# Patient Record
Sex: FEMALE | Race: WHITE | HISPANIC OR LATINO | Employment: UNEMPLOYED | URBAN - METROPOLITAN AREA
[De-identification: names, ages, dates, MRNs, and addresses within clinical notes are randomized per-mention and may not be internally consistent; named-entity substitution may affect disease eponyms.]

---
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
## 2022-06-05 ENCOUNTER — OFFICE VISIT (OUTPATIENT)
Dept: URGENT CARE | Age: 22
End: 2022-06-05
Payer: COMMERCIAL

## 2022-06-05 VITALS — TEMPERATURE: 97 F | SYSTOLIC BLOOD PRESSURE: 135 MMHG | DIASTOLIC BLOOD PRESSURE: 74 MMHG | RESPIRATION RATE: 16 BRPM

## 2022-06-05 DIAGNOSIS — J06.9 VIRAL URI WITH COUGH: Primary | ICD-10-CM

## 2022-06-05 DIAGNOSIS — R06.02 SHORTNESS OF BREATH: ICD-10-CM

## 2022-06-05 DIAGNOSIS — R50.9 FEVER, UNSPECIFIED FEVER CAUSE: ICD-10-CM

## 2022-06-05 LAB
FLUAV RNA RESP QL NAA+PROBE: NEGATIVE
FLUBV RNA RESP QL NAA+PROBE: NEGATIVE
SARS-COV-2 AG UPPER RESP QL IA: NEGATIVE
SARS-COV-2 RNA RESP QL NAA+PROBE: NEGATIVE
VALID CONTROL: NORMAL

## 2022-06-05 PROCEDURE — G0382 LEV 3 HOSP TYPE B ED VISIT: HCPCS

## 2022-06-05 PROCEDURE — 87636 SARSCOV2 & INF A&B AMP PRB: CPT

## 2022-06-05 PROCEDURE — S9083 URGENT CARE CENTER GLOBAL: HCPCS

## 2022-06-05 RX ORDER — PREDNISONE 20 MG/1
20 TABLET ORAL 2 TIMES DAILY WITH MEALS
Qty: 6 TABLET | Refills: 0 | Status: SHIPPED | OUTPATIENT
Start: 2022-06-05 | End: 2022-06-08

## 2022-06-05 RX ORDER — EPINEPHRINE 0.15 MG/.3ML
0.15 INJECTION INTRAMUSCULAR ONCE
COMMUNITY

## 2022-06-05 NOTE — PROGRESS NOTES
[address] [facility]        NAME: Bernadine Gonzalez is a 25 y o  female  : 2000    MRN: [id number]  DATE: 2022  TIME: 11:05 AM    Assessment and Plan   Viral URI with cough [J06 9]  1  Viral URI with cough  predniSONE 20 mg tablet    Poct Covid 19 Rapid Antigen Test   2  Fever, unspecified fever cause  Poct Covid 19 Rapid Antigen Test   3  Shortness of breath  predniSONE 20 mg tablet    Poct Covid 19 Rapid Antigen Test     Rapid COVID test negative  Awaiting results of COVID and flu test     Patient Instructions     Prednisone for shortness of breath  Ibuprofen or Tylenol as needed for fever  Quarantine as discussed  Follow up with PCP in 3-5 days  Proceed to  ER if symptoms worsen  Chief Complaint     Chief Complaint   Patient presents with    Cough     Cough, shortness of breath, for 3 days, headache and fever started last night  Has not taken a COVID test           History of Present Illness       Patient presenting for evaluation of worsening cough, shortness of breath, fever and headaches over the past 3 days  Patient states that she has been taking Mucinex and albuterol for her symptoms, is providing minimal relief  She states that her cough is nonproductive  Patient states that her T-max was 101 0°  Patient states that she has 1 COVID vaccine, but had a reaction and is unable to have any more  Patient denies any recent sick contacts  Cough  Associated symptoms include chest pain, a fever, headaches, rhinorrhea, a sore throat, shortness of breath and wheezing  Pertinent negatives include no chills, ear pain or rash  Shortness of Breath  The current episode started in the past 7 days  The problem occurs intermittently  The problem has been rapidly worsening since onset  Associated symptoms include chest pain, coughing, orthopnea, rhinorrhea, a sore throat and wheezing  Pertinent negatives include no leg swelling or palpitations   The symptoms are aggravated by smoke, odors, URIs, any activity, fumes, pollens and weather changes  Review of Systems   Review of Systems   Constitutional: Positive for fever  Negative for chills  HENT: Positive for rhinorrhea and sore throat  Negative for ear pain  Eyes: Negative for pain and visual disturbance  Respiratory: Positive for cough, shortness of breath and wheezing  Cardiovascular: Positive for chest pain and orthopnea  Negative for palpitations and leg swelling  Gastrointestinal: Negative for abdominal pain and vomiting  Genitourinary: Negative for dysuria and hematuria  Musculoskeletal: Negative for arthralgias, back pain and neck pain  Skin: Negative for color change and rash  Neurological: Positive for headaches  Negative for seizures and syncope  All other systems reviewed and are negative          Current Medications       Current Outpatient Medications:     albuterol (2 5 mg/3 mL) 0 083 % nebulizer solution, Take 1 vial (2 5 mg total) by nebulization every 6 (six) hours as needed for wheezing or shortness of breath, Disp: 75 mL, Rfl: 0    albuterol (ACCUNEB) 0 63 MG/3ML nebulizer solution, Take 1 ampule by nebulization every 4 (four) hours as needed for wheezing, Disp: , Rfl:     albuterol (PROVENTIL HFA,VENTOLIN HFA) 90 mcg/act inhaler, Inhale 2 puffs every 4 (four) hours as needed for wheezing, Disp: , Rfl:     albuterol (Ventolin HFA) 90 mcg/act inhaler, Inhale 2 puffs every 6 (six) hours as needed for wheezing, Disp: 18 g, Rfl: 3    EPINEPHrine (EPIPEN JR) 0 15 mg/0 3 mL SOAJ, Inject 0 15 mg into a muscle once, Disp: , Rfl:     predniSONE 20 mg tablet, Take 1 tablet (20 mg total) by mouth 2 (two) times a day with meals for 3 days, Disp: 6 tablet, Rfl: 0    budesonide-formoterol (SYMBICORT) 80-4 5 MCG/ACT inhaler, Inhale 2 puffs 2 (two) times a day Rinse mouth after use , Disp: 1 Inhaler, Rfl: 12    Current Allergies     Allergies as of 06/05/2022 - Reviewed 06/05/2022   Allergen Reaction Noted    Carboxymethylcellulose Anaphylaxis 04/23/2021    Covid-19 (adenovirus) vaccine Anaphylaxis 04/23/2021    Polysorbate 80 [sorbitan] Anaphylaxis 04/23/2021    Dust mite extract  07/30/2013    Pollen extract  07/30/2013    Tree extract  07/30/2013    Cat hair extract Hives 07/30/2013    Nuts - food allergy Rash and Throat Swelling 07/30/2013            The following portions of the patient's history were reviewed and updated as appropriate: allergies, current medications, past family history, past medical history, past social history, past surgical history and problem list      Past Medical History:   Diagnosis Date    Allergic     Allergic rhinitis     Anemia     Asthma     Eczema     Headache        No past surgical history on file  Family History   Problem Relation Age of Onset    Sjogren's syndrome Mother     Asthma Mother     Allergies Mother     No Known Problems Father     No Known Problems Brother     No Known Problems Brother     No Known Problems Brother          Medications have been verified  Objective   /74   Temp (!) 97 °F (36 1 °C)   Resp 16        Physical Exam     Physical Exam  Vitals and nursing note reviewed  Constitutional:       General: She is not in acute distress  Appearance: Normal appearance  She is normal weight  She is not ill-appearing, toxic-appearing or diaphoretic  HENT:      Head: Normocephalic and atraumatic  Right Ear: Tympanic membrane normal       Left Ear: Tympanic membrane normal       Nose: Congestion and rhinorrhea present  Rhinorrhea is clear  Mouth/Throat:      Mouth: Mucous membranes are moist       Pharynx: Oropharynx is clear  No oropharyngeal exudate or posterior oropharyngeal erythema  Eyes:      Extraocular Movements: Extraocular movements intact  Conjunctiva/sclera: Conjunctivae normal       Pupils: Pupils are equal, round, and reactive to light     Cardiovascular:      Rate and Rhythm: Normal rate and regular rhythm  Pulses: Normal pulses  Heart sounds: Normal heart sounds  No murmur heard  No friction rub  No gallop  Pulmonary:      Effort: Pulmonary effort is normal  No respiratory distress  Breath sounds: Normal breath sounds  No stridor  No wheezing, rhonchi or rales  Chest:      Chest wall: No tenderness  Abdominal:      General: Abdomen is flat  Bowel sounds are normal       Palpations: Abdomen is soft  Tenderness: There is no abdominal tenderness  There is no guarding or rebound  Musculoskeletal:         General: No tenderness  Normal range of motion  Cervical back: Normal range of motion and neck supple  Skin:     General: Skin is warm and dry  Capillary Refill: Capillary refill takes less than 2 seconds  Neurological:      General: No focal deficit present  Mental Status: She is alert and oriented to person, place, and time     Psychiatric:         Mood and Affect: Mood normal          Behavior: Behavior normal

## 2022-06-05 NOTE — PATIENT INSTRUCTIONS
Please take steroid with breakfast and dinner for the next 3 days  You may continue to use Mucinex and albuterol as needed  Please take ibuprofen or Tylenol as needed for headache and fever  If symptoms persist, please follow-up with your primary care provider  Please allow 24-48 hours for COVID and flu test to results  If you are positive for COVID, we recommend a 5 day quarantine from symptom onset  If you are positive for flu, we recommend quarantine into your fever free for 24 hours without fever reducing medications

## 2023-07-14 ENCOUNTER — APPOINTMENT (OUTPATIENT)
Dept: LAB | Facility: CLINIC | Age: 23
End: 2023-07-14
Payer: COMMERCIAL

## 2023-07-14 DIAGNOSIS — D64.9 ANEMIA, UNSPECIFIED TYPE: ICD-10-CM

## 2023-07-14 DIAGNOSIS — Z00.01 ENCOUNTER FOR GENERAL ADULT MEDICAL EXAMINATION WITH ABNORMAL FINDINGS: ICD-10-CM

## 2023-07-14 LAB
25(OH)D3 SERPL-MCNC: 22 NG/ML (ref 30–100)
ALBUMIN SERPL BCP-MCNC: 3.7 G/DL (ref 3.5–5)
ALP SERPL-CCNC: 48 U/L (ref 46–116)
ALT SERPL W P-5'-P-CCNC: 14 U/L (ref 12–78)
ANION GAP SERPL CALCULATED.3IONS-SCNC: 5 MMOL/L
AST SERPL W P-5'-P-CCNC: 10 U/L (ref 5–45)
BASOPHILS # BLD AUTO: 0.04 THOUSANDS/ÂΜL (ref 0–0.1)
BASOPHILS NFR BLD AUTO: 1 % (ref 0–1)
BILIRUB SERPL-MCNC: 0.64 MG/DL (ref 0.2–1)
BUN SERPL-MCNC: 13 MG/DL (ref 5–25)
CALCIUM SERPL-MCNC: 9 MG/DL (ref 8.3–10.1)
CHLORIDE SERPL-SCNC: 110 MMOL/L (ref 96–108)
CO2 SERPL-SCNC: 24 MMOL/L (ref 21–32)
CREAT SERPL-MCNC: 0.89 MG/DL (ref 0.6–1.3)
EOSINOPHIL # BLD AUTO: 0.16 THOUSAND/ÂΜL (ref 0–0.61)
EOSINOPHIL NFR BLD AUTO: 3 % (ref 0–6)
ERYTHROCYTE [DISTWIDTH] IN BLOOD BY AUTOMATED COUNT: 17.7 % (ref 11.6–15.1)
FERRITIN SERPL-MCNC: 5 NG/ML (ref 11–307)
GFR SERPL CREATININE-BSD FRML MDRD: 93 ML/MIN/1.73SQ M
GLUCOSE P FAST SERPL-MCNC: 82 MG/DL (ref 65–99)
HCT VFR BLD AUTO: 36.5 % (ref 34.8–46.1)
HGB BLD-MCNC: 10.7 G/DL (ref 11.5–15.4)
IMM GRANULOCYTES # BLD AUTO: 0.01 THOUSAND/UL (ref 0–0.2)
IMM GRANULOCYTES NFR BLD AUTO: 0 % (ref 0–2)
LYMPHOCYTES # BLD AUTO: 2.29 THOUSANDS/ÂΜL (ref 0.6–4.47)
LYMPHOCYTES NFR BLD AUTO: 37 % (ref 14–44)
MCH RBC QN AUTO: 23 PG (ref 26.8–34.3)
MCHC RBC AUTO-ENTMCNC: 29.3 G/DL (ref 31.4–37.4)
MCV RBC AUTO: 78 FL (ref 82–98)
MONOCYTES # BLD AUTO: 0.52 THOUSAND/ÂΜL (ref 0.17–1.22)
MONOCYTES NFR BLD AUTO: 8 % (ref 4–12)
NEUTROPHILS # BLD AUTO: 3.19 THOUSANDS/ÂΜL (ref 1.85–7.62)
NEUTS SEG NFR BLD AUTO: 51 % (ref 43–75)
NRBC BLD AUTO-RTO: 0 /100 WBCS
PLATELET # BLD AUTO: 318 THOUSANDS/UL (ref 149–390)
PMV BLD AUTO: 10.4 FL (ref 8.9–12.7)
POTASSIUM SERPL-SCNC: 4 MMOL/L (ref 3.5–5.3)
PROT SERPL-MCNC: 7.1 G/DL (ref 6.4–8.4)
RBC # BLD AUTO: 4.66 MILLION/UL (ref 3.81–5.12)
SODIUM SERPL-SCNC: 139 MMOL/L (ref 135–147)
TSH SERPL DL<=0.05 MIU/L-ACNC: 1.04 UIU/ML (ref 0.45–4.5)
VIT B12 SERPL-MCNC: 177 PG/ML (ref 180–914)
WBC # BLD AUTO: 6.21 THOUSAND/UL (ref 4.31–10.16)

## 2023-07-14 PROCEDURE — 36415 COLL VENOUS BLD VENIPUNCTURE: CPT

## 2023-07-14 PROCEDURE — 80053 COMPREHEN METABOLIC PANEL: CPT

## 2023-07-14 PROCEDURE — 85025 COMPLETE CBC W/AUTO DIFF WBC: CPT

## 2023-07-14 PROCEDURE — 82306 VITAMIN D 25 HYDROXY: CPT

## 2023-07-14 PROCEDURE — 82728 ASSAY OF FERRITIN: CPT

## 2023-07-14 PROCEDURE — 82607 VITAMIN B-12: CPT

## 2023-07-14 PROCEDURE — 84443 ASSAY THYROID STIM HORMONE: CPT

## 2023-10-07 ENCOUNTER — APPOINTMENT (OUTPATIENT)
Dept: LAB | Facility: CLINIC | Age: 23
End: 2023-10-07
Payer: COMMERCIAL

## 2023-10-07 ENCOUNTER — TRANSCRIBE ORDERS (OUTPATIENT)
Dept: LAB | Facility: CLINIC | Age: 23
End: 2023-10-07

## 2023-10-07 DIAGNOSIS — E55.9 AVITAMINOSIS D: ICD-10-CM

## 2023-10-07 DIAGNOSIS — D64.9 ANEMIA, UNSPECIFIED TYPE: Primary | ICD-10-CM

## 2023-10-07 DIAGNOSIS — E53.9 VITAMIN B-COMPLEX DEFICIENCY: ICD-10-CM

## 2023-10-07 DIAGNOSIS — D64.9 ANEMIA, UNSPECIFIED TYPE: ICD-10-CM

## 2023-10-07 LAB
25(OH)D3 SERPL-MCNC: 47.8 NG/ML (ref 30–100)
ANA SER QL IA: NEGATIVE
BASOPHILS # BLD AUTO: 0.03 THOUSANDS/ÂΜL (ref 0–0.1)
BASOPHILS NFR BLD AUTO: 1 % (ref 0–1)
EOSINOPHIL # BLD AUTO: 0.12 THOUSAND/ÂΜL (ref 0–0.61)
EOSINOPHIL NFR BLD AUTO: 2 % (ref 0–6)
ERYTHROCYTE [DISTWIDTH] IN BLOOD BY AUTOMATED COUNT: 15.7 % (ref 11.6–15.1)
ERYTHROCYTE [SEDIMENTATION RATE] IN BLOOD: 18 MM/HOUR (ref 0–19)
FERRITIN SERPL-MCNC: 11 NG/ML (ref 11–307)
FOLATE SERPL-MCNC: 12.9 NG/ML
HCT VFR BLD AUTO: 42.4 % (ref 34.8–46.1)
HGB BLD-MCNC: 13 G/DL (ref 11.5–15.4)
IMM GRANULOCYTES # BLD AUTO: 0.02 THOUSAND/UL (ref 0–0.2)
IMM GRANULOCYTES NFR BLD AUTO: 0 % (ref 0–2)
IRON SATN MFR SERPL: 20 % (ref 15–50)
IRON SERPL-MCNC: 76 UG/DL (ref 50–212)
LYMPHOCYTES # BLD AUTO: 2.21 THOUSANDS/ÂΜL (ref 0.6–4.47)
LYMPHOCYTES NFR BLD AUTO: 36 % (ref 14–44)
MCH RBC QN AUTO: 25.3 PG (ref 26.8–34.3)
MCHC RBC AUTO-ENTMCNC: 30.7 G/DL (ref 31.4–37.4)
MCV RBC AUTO: 83 FL (ref 82–98)
MONOCYTES # BLD AUTO: 0.46 THOUSAND/ÂΜL (ref 0.17–1.22)
MONOCYTES NFR BLD AUTO: 7 % (ref 4–12)
NEUTROPHILS # BLD AUTO: 3.34 THOUSANDS/ÂΜL (ref 1.85–7.62)
NEUTS SEG NFR BLD AUTO: 54 % (ref 43–75)
NRBC BLD AUTO-RTO: 0 /100 WBCS
PLATELET # BLD AUTO: 300 THOUSANDS/UL (ref 149–390)
PMV BLD AUTO: 10 FL (ref 8.9–12.7)
RBC # BLD AUTO: 5.14 MILLION/UL (ref 3.81–5.12)
RETICS # AUTO: NORMAL 10*3/UL (ref 14097–95744)
RETICS # CALC: 1.69 % (ref 0.37–1.87)
TIBC SERPL-MCNC: 379 UG/DL (ref 250–450)
UIBC SERPL-MCNC: 303 UG/DL (ref 155–355)
VIT B12 SERPL-MCNC: 805 PG/ML (ref 180–914)
WBC # BLD AUTO: 6.18 THOUSAND/UL (ref 4.31–10.16)

## 2023-10-07 PROCEDURE — 86038 ANTINUCLEAR ANTIBODIES: CPT

## 2023-10-07 PROCEDURE — 86430 RHEUMATOID FACTOR TEST QUAL: CPT

## 2023-10-07 PROCEDURE — 85025 COMPLETE CBC W/AUTO DIFF WBC: CPT

## 2023-10-07 PROCEDURE — 36415 COLL VENOUS BLD VENIPUNCTURE: CPT

## 2023-10-07 PROCEDURE — 82607 VITAMIN B-12: CPT

## 2023-10-07 PROCEDURE — 85652 RBC SED RATE AUTOMATED: CPT

## 2023-10-07 PROCEDURE — 83550 IRON BINDING TEST: CPT

## 2023-10-07 PROCEDURE — 83540 ASSAY OF IRON: CPT

## 2023-10-07 PROCEDURE — 82728 ASSAY OF FERRITIN: CPT

## 2023-10-07 PROCEDURE — 82746 ASSAY OF FOLIC ACID SERUM: CPT

## 2023-10-07 PROCEDURE — 85045 AUTOMATED RETICULOCYTE COUNT: CPT

## 2023-10-07 PROCEDURE — 82306 VITAMIN D 25 HYDROXY: CPT

## 2023-10-08 LAB — RHEUMATOID FACT SER QL LA: NEGATIVE

## 2023-12-01 ENCOUNTER — APPOINTMENT (EMERGENCY)
Dept: RADIOLOGY | Facility: HOSPITAL | Age: 23
End: 2023-12-01
Payer: COMMERCIAL

## 2023-12-01 ENCOUNTER — ANESTHESIA (EMERGENCY)
Dept: PERIOP | Facility: HOSPITAL | Age: 23
End: 2023-12-01
Payer: COMMERCIAL

## 2023-12-01 ENCOUNTER — HOSPITAL ENCOUNTER (EMERGENCY)
Facility: HOSPITAL | Age: 23
Discharge: HOME | End: 2023-12-01
Attending: EMERGENCY MEDICINE | Admitting: OBSTETRICS & GYNECOLOGY
Payer: COMMERCIAL

## 2023-12-01 ENCOUNTER — ANESTHESIA EVENT (EMERGENCY)
Dept: PERIOP | Facility: HOSPITAL | Age: 23
End: 2023-12-01
Payer: COMMERCIAL

## 2023-12-01 VITALS
RESPIRATION RATE: 17 BRPM | DIASTOLIC BLOOD PRESSURE: 68 MMHG | SYSTOLIC BLOOD PRESSURE: 117 MMHG | TEMPERATURE: 97.5 F | OXYGEN SATURATION: 100 % | HEART RATE: 88 BPM

## 2023-12-01 DIAGNOSIS — O00.90 ECTOPIC PREGNANCY: Primary | ICD-10-CM

## 2023-12-01 LAB
ABO GROUP BLD: NORMAL
ALBUMIN SERPL BCP-MCNC: 4.1 G/DL (ref 3.5–5)
ALP SERPL-CCNC: 34 U/L (ref 34–104)
ALT SERPL W P-5'-P-CCNC: 12 U/L (ref 7–52)
ANION GAP SERPL CALCULATED.3IONS-SCNC: 9 MMOL/L
AST SERPL W P-5'-P-CCNC: 15 U/L (ref 13–39)
B-HCG SERPL-ACNC: 6295 MIU/ML (ref 0–5)
BACTERIA UR QL AUTO: ABNORMAL /HPF
BILIRUB SERPL-MCNC: 0.71 MG/DL (ref 0.2–1)
BILIRUB UR QL STRIP: NEGATIVE
BUN SERPL-MCNC: 8 MG/DL (ref 5–25)
CALCIUM SERPL-MCNC: 8.4 MG/DL (ref 8.4–10.2)
CHLORIDE SERPL-SCNC: 109 MMOL/L (ref 96–108)
CLARITY UR: CLEAR
CO2 SERPL-SCNC: 20 MMOL/L (ref 21–32)
COLOR UR: ABNORMAL
CREAT SERPL-MCNC: 0.65 MG/DL (ref 0.6–1.3)
ERYTHROCYTE [DISTWIDTH] IN BLOOD BY AUTOMATED COUNT: 13.8 % (ref 11.6–15.1)
EXT PREGNANCY TEST URINE: POSITIVE
EXT. CONTROL: ABNORMAL
GFR SERPL CREATININE-BSD FRML MDRD: 127 ML/MIN/1.73SQ M
GLUCOSE SERPL-MCNC: 77 MG/DL (ref 65–140)
GLUCOSE SERPL-MCNC: 87 MG/DL (ref 65–140)
GLUCOSE UR STRIP-MCNC: NEGATIVE MG/DL
HCT VFR BLD AUTO: 38.1 % (ref 34.8–46.1)
HGB BLD-MCNC: 12.3 G/DL (ref 11.5–15.4)
HGB UR QL STRIP.AUTO: ABNORMAL
KETONES UR STRIP-MCNC: NEGATIVE MG/DL
LEUKOCYTE ESTERASE UR QL STRIP: NEGATIVE
MCH RBC QN AUTO: 26.3 PG (ref 26.8–34.3)
MCHC RBC AUTO-ENTMCNC: 32.3 G/DL (ref 31.4–37.4)
MCV RBC AUTO: 81 FL (ref 82–98)
MUCOUS THREADS UR QL AUTO: ABNORMAL
NITRITE UR QL STRIP: NEGATIVE
NON-SQ EPI CELLS URNS QL MICRO: ABNORMAL /HPF
PH UR STRIP.AUTO: 7.5 [PH]
PLATELET # BLD AUTO: 250 THOUSANDS/UL (ref 149–390)
PMV BLD AUTO: 9.5 FL (ref 8.9–12.7)
POTASSIUM SERPL-SCNC: 3.9 MMOL/L (ref 3.5–5.3)
PROT SERPL-MCNC: 6.2 G/DL (ref 6.4–8.4)
PROT UR STRIP-MCNC: ABNORMAL MG/DL
RBC # BLD AUTO: 4.68 MILLION/UL (ref 3.81–5.12)
RBC #/AREA URNS AUTO: ABNORMAL /HPF
RH BLD: POSITIVE
SODIUM SERPL-SCNC: 138 MMOL/L (ref 135–147)
SP GR UR STRIP.AUTO: 1.02 (ref 1–1.03)
UROBILINOGEN UR STRIP-ACNC: <2 MG/DL
WBC # BLD AUTO: 6.78 THOUSAND/UL (ref 4.31–10.16)
WBC #/AREA URNS AUTO: ABNORMAL /HPF

## 2023-12-01 PROCEDURE — 86900 BLOOD TYPING SEROLOGIC ABO: CPT

## 2023-12-01 PROCEDURE — 85027 COMPLETE CBC AUTOMATED: CPT

## 2023-12-01 PROCEDURE — 36415 COLL VENOUS BLD VENIPUNCTURE: CPT

## 2023-12-01 PROCEDURE — 84702 CHORIONIC GONADOTROPIN TEST: CPT

## 2023-12-01 PROCEDURE — 82948 REAGENT STRIP/BLOOD GLUCOSE: CPT

## 2023-12-01 PROCEDURE — 86850 RBC ANTIBODY SCREEN: CPT

## 2023-12-01 PROCEDURE — 81025 URINE PREGNANCY TEST: CPT

## 2023-12-01 PROCEDURE — 80053 COMPREHEN METABOLIC PANEL: CPT

## 2023-12-01 PROCEDURE — 99284 EMERGENCY DEPT VISIT MOD MDM: CPT

## 2023-12-01 PROCEDURE — 76801 OB US < 14 WKS SINGLE FETUS: CPT

## 2023-12-01 PROCEDURE — 96375 TX/PRO/DX INJ NEW DRUG ADDON: CPT

## 2023-12-01 PROCEDURE — 88305 TISSUE EXAM BY PATHOLOGIST: CPT | Performed by: PATHOLOGY

## 2023-12-01 PROCEDURE — 96366 THER/PROPH/DIAG IV INF ADDON: CPT

## 2023-12-01 PROCEDURE — 96365 THER/PROPH/DIAG IV INF INIT: CPT

## 2023-12-01 PROCEDURE — 81001 URINALYSIS AUTO W/SCOPE: CPT

## 2023-12-01 PROCEDURE — 86901 BLOOD TYPING SEROLOGIC RH(D): CPT

## 2023-12-01 RX ORDER — FENTANYL CITRATE 50 UG/ML
INJECTION, SOLUTION INTRAMUSCULAR; INTRAVENOUS AS NEEDED
Status: DISCONTINUED | OUTPATIENT
Start: 2023-12-01 | End: 2023-12-01

## 2023-12-01 RX ORDER — IBUPROFEN 600 MG/1
600 TABLET ORAL EVERY 6 HOURS PRN
Status: DISCONTINUED | OUTPATIENT
Start: 2023-12-01 | End: 2023-12-01 | Stop reason: HOSPADM

## 2023-12-01 RX ORDER — OXYCODONE HYDROCHLORIDE 5 MG/1
10 TABLET ORAL EVERY 4 HOURS PRN
Status: DISCONTINUED | OUTPATIENT
Start: 2023-12-01 | End: 2023-12-01 | Stop reason: HOSPADM

## 2023-12-01 RX ORDER — ONDANSETRON 2 MG/ML
INJECTION INTRAMUSCULAR; INTRAVENOUS AS NEEDED
Status: DISCONTINUED | OUTPATIENT
Start: 2023-12-01 | End: 2023-12-01

## 2023-12-01 RX ORDER — OXYCODONE HYDROCHLORIDE 5 MG/1
5 TABLET ORAL EVERY 4 HOURS PRN
Status: DISCONTINUED | OUTPATIENT
Start: 2023-12-01 | End: 2023-12-01 | Stop reason: HOSPADM

## 2023-12-01 RX ORDER — SODIUM CHLORIDE, SODIUM LACTATE, POTASSIUM CHLORIDE, CALCIUM CHLORIDE 600; 310; 30; 20 MG/100ML; MG/100ML; MG/100ML; MG/100ML
INJECTION, SOLUTION INTRAVENOUS CONTINUOUS PRN
Status: DISCONTINUED | OUTPATIENT
Start: 2023-12-01 | End: 2023-12-01

## 2023-12-01 RX ORDER — ROCURONIUM BROMIDE 10 MG/ML
INJECTION, SOLUTION INTRAVENOUS AS NEEDED
Status: DISCONTINUED | OUTPATIENT
Start: 2023-12-01 | End: 2023-12-01

## 2023-12-01 RX ORDER — IBUPROFEN 600 MG/1
600 TABLET ORAL EVERY 6 HOURS PRN
Qty: 30 TABLET | Refills: 0 | Status: SHIPPED | OUTPATIENT
Start: 2023-12-01

## 2023-12-01 RX ORDER — MIDAZOLAM HYDROCHLORIDE 2 MG/2ML
INJECTION, SOLUTION INTRAMUSCULAR; INTRAVENOUS AS NEEDED
Status: DISCONTINUED | OUTPATIENT
Start: 2023-12-01 | End: 2023-12-01

## 2023-12-01 RX ORDER — ETOMIDATE 2 MG/ML
INJECTION INTRAVENOUS AS NEEDED
Status: DISCONTINUED | OUTPATIENT
Start: 2023-12-01 | End: 2023-12-01

## 2023-12-01 RX ORDER — ONDANSETRON 2 MG/ML
4 INJECTION INTRAMUSCULAR; INTRAVENOUS EVERY 6 HOURS PRN
Status: DISCONTINUED | OUTPATIENT
Start: 2023-12-01 | End: 2023-12-01 | Stop reason: HOSPADM

## 2023-12-01 RX ORDER — SODIUM CHLORIDE, SODIUM GLUCONATE, SODIUM ACETATE, POTASSIUM CHLORIDE, MAGNESIUM CHLORIDE, SODIUM PHOSPHATE, DIBASIC, AND POTASSIUM PHOSPHATE .53; .5; .37; .037; .03; .012; .00082 G/100ML; G/100ML; G/100ML; G/100ML; G/100ML; G/100ML; G/100ML
1000 INJECTION, SOLUTION INTRAVENOUS ONCE
Status: COMPLETED | OUTPATIENT
Start: 2023-12-01 | End: 2023-12-01

## 2023-12-01 RX ORDER — KETOROLAC TROMETHAMINE 30 MG/ML
INJECTION, SOLUTION INTRAMUSCULAR; INTRAVENOUS AS NEEDED
Status: DISCONTINUED | OUTPATIENT
Start: 2023-12-01 | End: 2023-12-01

## 2023-12-01 RX ORDER — FENTANYL CITRATE/PF 50 MCG/ML
50 SYRINGE (ML) INJECTION
Status: DISCONTINUED | OUTPATIENT
Start: 2023-12-01 | End: 2023-12-01 | Stop reason: HOSPADM

## 2023-12-01 RX ORDER — DEXAMETHASONE SODIUM PHOSPHATE 10 MG/ML
INJECTION, SOLUTION INTRAMUSCULAR; INTRAVENOUS AS NEEDED
Status: DISCONTINUED | OUTPATIENT
Start: 2023-12-01 | End: 2023-12-01

## 2023-12-01 RX ORDER — ACETAMINOPHEN 325 MG/1
975 TABLET ORAL EVERY 6 HOURS PRN
Status: DISCONTINUED | OUTPATIENT
Start: 2023-12-01 | End: 2023-12-01 | Stop reason: HOSPADM

## 2023-12-01 RX ORDER — NEOSTIGMINE METHYLSULFATE 1 MG/ML
INJECTION INTRAVENOUS AS NEEDED
Status: DISCONTINUED | OUTPATIENT
Start: 2023-12-01 | End: 2023-12-01

## 2023-12-01 RX ORDER — GLYCOPYRROLATE 0.2 MG/ML
INJECTION INTRAMUSCULAR; INTRAVENOUS AS NEEDED
Status: DISCONTINUED | OUTPATIENT
Start: 2023-12-01 | End: 2023-12-01

## 2023-12-01 RX ORDER — SUCCINYLCHOLINE/SOD CL,ISO/PF 100 MG/5ML
SYRINGE (ML) INTRAVENOUS AS NEEDED
Status: DISCONTINUED | OUTPATIENT
Start: 2023-12-01 | End: 2023-12-01

## 2023-12-01 RX ADMIN — SODIUM CHLORIDE 12 MCG: 9 INJECTION, SOLUTION INTRAVENOUS at 17:27

## 2023-12-01 RX ADMIN — FENTANYL CITRATE 50 MCG: 50 INJECTION INTRAMUSCULAR; INTRAVENOUS at 17:41

## 2023-12-01 RX ADMIN — SODIUM CHLORIDE, SODIUM LACTATE, POTASSIUM CHLORIDE, AND CALCIUM CHLORIDE: .6; .31; .03; .02 INJECTION, SOLUTION INTRAVENOUS at 16:05

## 2023-12-01 RX ADMIN — ONDANSETRON 4 MG: 2 INJECTION INTRAMUSCULAR; INTRAVENOUS at 17:01

## 2023-12-01 RX ADMIN — SODIUM CHLORIDE, SODIUM LACTATE, POTASSIUM CHLORIDE, AND CALCIUM CHLORIDE: .6; .31; .03; .02 INJECTION, SOLUTION INTRAVENOUS at 17:13

## 2023-12-01 RX ADMIN — SODIUM CHLORIDE, SODIUM GLUCONATE, SODIUM ACETATE, POTASSIUM CHLORIDE, MAGNESIUM CHLORIDE, SODIUM PHOSPHATE, DIBASIC, AND POTASSIUM PHOSPHATE 1000 ML: .53; .5; .37; .037; .03; .012; .00082 INJECTION, SOLUTION INTRAVENOUS at 12:42

## 2023-12-01 RX ADMIN — FENTANYL CITRATE 100 MCG: 50 INJECTION INTRAMUSCULAR; INTRAVENOUS at 16:11

## 2023-12-01 RX ADMIN — DEXAMETHASONE SODIUM PHOSPHATE 10 MG: 10 INJECTION, SOLUTION INTRAMUSCULAR; INTRAVENOUS at 16:34

## 2023-12-01 RX ADMIN — KETOROLAC TROMETHAMINE 30 MG: 30 INJECTION, SOLUTION INTRAMUSCULAR; INTRAVENOUS at 17:06

## 2023-12-01 RX ADMIN — FENTANYL CITRATE 100 MCG: 50 INJECTION INTRAMUSCULAR; INTRAVENOUS at 16:40

## 2023-12-01 RX ADMIN — IBUPROFEN 600 MG: 600 TABLET, FILM COATED ORAL at 19:24

## 2023-12-01 RX ADMIN — Medication 100 MG: at 16:13

## 2023-12-01 RX ADMIN — NEOSTIGMINE 3 MG: 1 INJECTION INTRAVENOUS at 17:19

## 2023-12-01 RX ADMIN — ETOMIDATE 20 MG: 2 INJECTION INTRAVENOUS at 16:12

## 2023-12-01 RX ADMIN — GLYCOPYRROLATE 0.2 MCG: 0.2 INJECTION, SOLUTION INTRAMUSCULAR; INTRAVENOUS at 17:19

## 2023-12-01 RX ADMIN — ROCURONIUM BROMIDE 25 MG: 10 INJECTION, SOLUTION INTRAVENOUS at 16:19

## 2023-12-01 RX ADMIN — MIDAZOLAM 2 MG: 1 INJECTION INTRAMUSCULAR; INTRAVENOUS at 16:03

## 2023-12-01 NOTE — Clinical Note
Morena Jha accompanied Rosalina Morales to the emergency department on 12/1/2023. Return date if applicable: 22/68/3662        If you have any questions or concerns, please don't hesitate to call.       Michelle iBllings RN

## 2023-12-01 NOTE — ANESTHESIA POSTPROCEDURE EVALUATION
Post-Op Assessment Note    CV Status:  Stable  Pain Score: 0    Pain management: adequate       Mental Status:  Sleepy   Hydration Status:  Stable   PONV Controlled:  None   Airway Patency:  Patent     Post Op Vitals Reviewed: Yes    No anethesia notable event occurred.     Staff: CRNA               BP   123/58   Temp   97.4   Pulse 107   Resp   16   SpO2   97

## 2023-12-01 NOTE — H&P
H&P - Gynecology   Елена Mercedes 26 y.o. female MRN: [id number]  Unit/Bed#: ED 08 Encounter: [id number]      Assessment/Plan   Ectopic pregnancy without intrauterine pregnancy  Assessment & Plan  Troy Ortiz is a 22y female LMP 10/8/23 who was sent from [facility] for management of ectopic pregnancy. Patient hemodynamically stable, no evidence of rupture of ectopic pregnancy. Discussed medical management (methotrexate) versus surgical management with laparoscopic salpingectomy. Risks of methotrexate use include possible need for second dose of methotrexate, need for close office and laboratory follow-up, rupture, stomatitis, GI upset, tubal scarring. Risks of laparoscopic salpingectomy include bleeding, infection, damage to surrounding structures, future ectopic pregnancies. Discussed that fertility would not be compromised with one functional tube. Recommended HSG at some point in the future. Patient's mother shared concerns of methotrexate injection, due to anaphylactic reaction to COVID-19 vaccine in the past. We confirmed with pharmacy that the methotrexate injection does not contain the allergen that patient had allergic reaction to. After patient discussed with mother and partner, patient decided to proceed with surgical management. Consent signed for unilateral salpingectomy. Patient opted for hospital disposition for products of conception, and form signed. HCG, Quant   Date Value Ref Range Status   12/01/2023 6,295 (H) 0 - 5 mIU/mL Final        FINDINGS:     UTERUS:  The uterus is anteverted in position, measuring 6.2 x 3.4 x 5.8 cm. Contour and echotexture appear normal.  The cervix is closed and within normal limits. ENDOMETRIUM:  Endometrial stripe measures 5.0 mm. No evidence of intrauterine gestation. OVARIES/ADNEXA:  Within the right adnexa there is a 1.5 x 1.5 x 1.2 cm hyperechoic rounded structure with central anechoic focus and ring of color flow.  This is located superior to the ovary. Trace pelvic free fluid. Right ovary:  2.8 x 2.2 x 1.2 cm. Volume 3.9  Doppler flow present. No suspicious abnormality. Left ovary:  2.3 x 1.7 x 2.4 cm. Volume  4.8  Doppler flow present. No suspicious abnormality. IMPRESSION:  Findings concerning for ectopic pregnancy. There is a hyperechoic structure in the right adnexa with a ring of vascularity, in the absence of an intrauterine gestational sac. Plan:   - CBC, CMP, T&S  - Diet: NPO  - SCDs for DVT ppx  - proceed to OR for laparoscopic unilateral salpingectomy        History of Present Illness   Sully Gomez is a 26 y.o.  who presents for management of ectopic pregnancy. She was seen at [facility], where they were unable to confirm an intrauterine pregnancy. Two days ago, she reports heavy vaginal bleeding, but has since stopped bleeding. [facility] sent patient to the ED for concern of ectopic pregnancy. She denies vaginal bleeding, abdominal pain, nausea, vomiting, fever, chills, shortness of breath, lightheadedness dizziness, palpitations. Review of Systems   Constitutional:  Negative for appetite change, chills and fever. Respiratory:  Negative for shortness of breath. Cardiovascular:  Negative for chest pain. Gastrointestinal:  Negative for abdominal pain, nausea and vomiting. Genitourinary:  Negative for flank pain, vaginal bleeding and vaginal pain. Musculoskeletal:  Negative for back pain. Neurological:  Negative for light-headedness and headaches. Historical Information   Past Medical History:   Diagnosis Date    Allergic     Allergic rhinitis     Anemia     Asthma     Eczema     Headache      History reviewed. No pertinent surgical history.   OB/GYN History:   OB History          1    Para        Term                AB        Living             SAB        IAB        Ectopic        Multiple        Live Births                     Family History Problem Relation Age of Onset    Sjogren's syndrome Mother     Asthma Mother     Allergies Mother     No Known Problems Father     No Known Problems Brother     No Known Problems Brother     No Known Problems Brother      Social History   Social History     Substance and Sexual Activity   Alcohol Use No     Social History     Substance and Sexual Activity   Drug Use No     Social History     Tobacco Use   Smoking Status Never   Smokeless Tobacco Never       Meds/Allergies   (Not in a hospital admission)    Allergies   Allergen Reactions    Carboxymethylcellulose Anaphylaxis     See Allergy note on 4/21/2021  Also anaphylaxis to [employer] COVID 19    Covid-19 (Adenovirus) Vaccine Anaphylaxis     See Allergist note on 4/21/2021    Polysorbate 80 [Sorbitan] Anaphylaxis     Anaphylaxis     Dust Mite Extract      Causes asthma    Pollen Extract     Tree Extract      Ragweed - skin swelling at allergy testing      Cat Hair Extract Hives    Nuts - Food Allergy Rash and Throat Swelling     Tree nuts and peanuts       Objective   /80 (BP Location: Left arm)   Pulse (!) 113   Temp 98.6 °F (37 °C) (Temporal)   Resp 16   SpO2 99%     No intake or output data in the 24 hours ending 12/01/23 1358      Physical Exam  Constitutional:       General: She is not in acute distress. Appearance: Normal appearance. She is not ill-appearing. Cardiovascular:      Rate and Rhythm: Tachycardia present. Heart sounds: Normal heart sounds. Pulmonary:      Effort: Pulmonary effort is normal. No respiratory distress. Chest:      Chest wall: No tenderness. Abdominal:      Palpations: Abdomen is soft. Tenderness: There is no abdominal tenderness. There is no guarding or rebound. Musculoskeletal:      Right lower leg: No edema. Left lower leg: No edema. Skin:     General: Skin is warm and dry. Neurological:      Mental Status: She is alert.          Lab Results:   Admission on 12/01/2023   Component Date Value    Color, UA 12/01/2023 Light Yellow     Clarity, UA 12/01/2023 Clear     Specific Gravity, UA 12/01/2023 1.020     pH, UA 12/01/2023 7.5     Leukocytes, UA 12/01/2023 Negative     Nitrite, UA 12/01/2023 Negative     Protein, UA 12/01/2023 Trace (A)     Glucose, UA 12/01/2023 Negative     Ketones, UA 12/01/2023 Negative     Urobilinogen, UA 12/01/2023 <2.0     Bilirubin, UA 12/01/2023 Negative     Occult Blood, UA 12/01/2023 Small (A)     ABO Grouping 12/01/2023 O     Rh Factor 12/01/2023 Positive     Antibody Screen 12/01/2023 Negative     Specimen Expiration Date 12/01/2023 [id number]     HCG, Quant 12/01/2023 6,295 (H)     EXT Preg Test, Ur 12/01/2023 Positive (A)     Control 12/01/2023 Valid     RBC, UA 12/01/2023 1-2     WBC, UA 12/01/2023 1-2     Epithelial Cells 12/01/2023 Occasional     Bacteria, UA 12/01/2023 Occasional     MUCUS THREADS 12/01/2023 Occasional (A)     WBC 12/01/2023 6.78     RBC 12/01/2023 4.68     Hemoglobin 12/01/2023 12.3     Hematocrit 12/01/2023 38.1     MCV 12/01/2023 81 (L)     MCH 12/01/2023 26.3 (L)     MCHC 12/01/2023 32.3     RDW 12/01/2023 13.8     Platelets 40/08/9932 250     MPV 12/01/2023 9.5     Sodium 12/01/2023 138     Potassium 12/01/2023 3.9     Chloride 12/01/2023 109 (H)     CO2 12/01/2023 20 (L)     ANION GAP 12/01/2023 9     BUN 12/01/2023 8     Creatinine 12/01/2023 0.65     Glucose 12/01/2023 77     Calcium 12/01/2023 8.4     AST 12/01/2023 15     ALT 12/01/2023 12     Alkaline Phosphatase 12/01/2023 34     Total Protein 12/01/2023 6.2 (L)     Albumin 12/01/2023 4.1     Total Bilirubin 12/01/2023 0.71     eGFR 12/01/2023 127     ABO Grouping 12/01/2023 O     Rh Factor 12/01/2023 Positive      Kaia Morris MD  Obstetrics & Gynecology, PGY-2  12/1/2023, 1:58 PM

## 2023-12-01 NOTE — Clinical Note
Sandhya Jaime to the emergency department on 12/1/2023. Return date if applicable: 87/95/5961        If you have any questions or concerns, please don't hesitate to call.       Kena Street RN

## 2023-12-01 NOTE — Clinical Note
Deb Cerrato accompanied Nahomy Leslie to the emergency department on 12/1/2023. Return date if applicable: 29/44/4565        If you have any questions or concerns, please don't hesitate to call.       Heavenly Bower RN

## 2023-12-01 NOTE — ED PROVIDER NOTES
History  Chief Complaint   Patient presents with    Pregnancy Problem     [facility] and informed she may have "ectopic" pregnancy and advised to come to ER. Pt states she is having abdominal pain and intermittent vaginal bleeding, Denies ant other symptoms      61-year-old female presenting to the emergency department for concerns of ectopic pregnancy. [facility] for [facility] hCG levels and abdominal pain. At hCG they were unable to confirm intrauterine pregnancy. Patient notes that the past 2 days she has had vaginal bleeding with some clots. Denies any dysuria hematuria. Denies any abdominal pain headaches dizziness tinnitus vision change neck pain back pain numbness or tingling in any of her extremities. Patient with lower abdominal pain. Prior to Admission Medications   Prescriptions Last Dose Informant Patient Reported? Taking? EPINEPHrine (EPIPEN JR) 0.15 mg/0.3 mL SOAJ   Yes No   Sig: Inject 0.15 mg into a muscle once   albuterol (2.5 mg/3 mL) 0.083 % nebulizer solution   No No   Sig: Take 1 vial (2.5 mg total) by nebulization every 6 (six) hours as needed for wheezing or shortness of breath   albuterol (ACCUNEB) 0.63 MG/3ML nebulizer solution   Yes No   Sig: Take 1 ampule by nebulization every 4 (four) hours as needed for wheezing   albuterol (PROVENTIL HFA,VENTOLIN HFA) 90 mcg/act inhaler   Yes No   Sig: Inhale 2 puffs every 4 (four) hours as needed for wheezing   albuterol (Ventolin HFA) 90 mcg/act inhaler   No No   Sig: Inhale 2 puffs every 6 (six) hours as needed for wheezing   budesonide-formoterol (SYMBICORT) 80-4.5 MCG/ACT inhaler   No No   Sig: Inhale 2 puffs 2 (two) times a day Rinse mouth after use. Facility-Administered Medications: None       Past Medical History:   Diagnosis Date    Allergic     Allergic rhinitis     Anemia     Asthma     Eczema     Headache        History reviewed.  No pertinent surgical history. Family History   Problem Relation Age of Onset    Sjogren's syndrome Mother     Asthma Mother     Allergies Mother     No Known Problems Father     No Known Problems Brother     No Known Problems Brother     No Known Problems Brother      I have reviewed and agree with the history as documented. E-Cigarette/Vaping    E-Cigarette Use Never User      E-Cigarette/Vaping Substances    Nicotine No     THC No     CBD No     Flavoring No     Other No     Unknown No      Social History     Tobacco Use    Smoking status: Never    Smokeless tobacco: Never   Vaping Use    Vaping Use: Never used   Substance Use Topics    Alcohol use: No    Drug use: No        Review of Systems   Constitutional:  Negative for activity change, chills and fever. HENT:  Negative for ear pain and sore throat. Eyes:  Negative for pain and visual disturbance. Respiratory:  Negative for cough and shortness of breath. Cardiovascular:  Negative for chest pain and palpitations. Gastrointestinal:  Negative for abdominal pain and vomiting. Genitourinary:  Positive for vaginal bleeding. Negative for decreased urine volume, difficulty urinating, dysuria and hematuria. Musculoskeletal:  Negative for arthralgias and back pain. Skin:  Negative for color change and rash. Neurological:  Negative for dizziness, seizures, syncope, weakness, light-headedness and headaches. Psychiatric/Behavioral:  Negative for agitation and behavioral problems. All other systems reviewed and are negative.       Physical Exam  ED Triage Vitals   Temperature Pulse Respirations Blood Pressure SpO2   12/01/23 1015 12/01/23 1015 12/01/23 1015 12/01/23 1015 12/01/23 1015   98.6 °F (37 °C) (!) 113 16 162/80 99 %      Temp Source Heart Rate Source Patient Position - Orthostatic VS BP Location FiO2 (%)   12/01/23 1015 12/01/23 1015 12/01/23 1416 12/01/23 1015 --   Temporal Monitor Lying Left arm       Pain Score       12/01/23 1015       8 Orthostatic Vital Signs  Vitals:    12/01/23 1015 12/01/23 1416   BP: 162/80 135/73   Pulse: (!) 113 (!) 119   Patient Position - Orthostatic VS:  Lying       Physical Exam  Vitals and nursing note reviewed. Constitutional:       General: She is not in acute distress. Appearance: Normal appearance. She is well-developed. HENT:      Head: Normocephalic and atraumatic. Right Ear: External ear normal.      Left Ear: External ear normal.      Nose: Nose normal.      Mouth/Throat:      Mouth: Mucous membranes are moist.   Eyes:      Extraocular Movements: Extraocular movements intact. Conjunctiva/sclera: Conjunctivae normal.   Cardiovascular:      Rate and Rhythm: Regular rhythm. Tachycardia present. Heart sounds: No murmur heard. Pulmonary:      Effort: Pulmonary effort is normal. No respiratory distress. Breath sounds: Normal breath sounds. Abdominal:      General: Abdomen is flat. Palpations: Abdomen is soft. Tenderness: There is no abdominal tenderness. There is no right CVA tenderness, left CVA tenderness, guarding or rebound. Musculoskeletal:         General: No swelling. Cervical back: Neck supple. Skin:     General: Skin is warm and dry. Capillary Refill: Capillary refill takes less than 2 seconds. Neurological:      General: No focal deficit present. Mental Status: She is alert.    Psychiatric:         Mood and Affect: Mood normal.         ED Medications  Medications   multi-electrolyte (ISOLYTE-S PH 7.4) bolus 1,000 mL (1,000 mL Intravenous New Bag 12/1/23 1242)       Diagnostic Studies  Results Reviewed       Procedure Component Value Units Date/Time    Comprehensive metabolic panel [[id number]]  (Abnormal) Collected: 12/01/23 1311    Lab Status: Final result Specimen: Blood from Arm, Left Updated: 12/01/23 1349     Sodium 138 mmol/L      Potassium 3.9 mmol/L      Chloride 109 mmol/L      CO2 20 mmol/L      ANION GAP 9 mmol/L      BUN 8 mg/dL Creatinine 0.65 mg/dL      Glucose 77 mg/dL      Calcium 8.4 mg/dL      AST 15 U/L      ALT 12 U/L      Alkaline Phosphatase 34 U/L      Total Protein 6.2 g/dL      Albumin 4.1 g/dL      Total Bilirubin 0.71 mg/dL      eGFR 127 ml/min/1.73sq m     Narrative:      [facility] guidelines for Chronic Kidney Disease (CKD):     Stage 1 with normal or high GFR (GFR > 90 mL/min/1.73 square meters)    Stage 2 Mild CKD (GFR = 60-89 mL/min/1.73 square meters)    Stage 3A Moderate CKD (GFR = 45-59 mL/min/1.73 square meters)    Stage 3B Moderate CKD (GFR = 30-44 mL/min/1.73 square meters)    Stage 4 Severe CKD (GFR = 15-29 mL/min/1.73 square meters)    Stage 5 End Stage CKD (GFR <15 mL/min/1.73 square meters)  Note: GFR calculation is accurate only with a steady state creatinine    CBC and Platelet [[id number]]  (Abnormal) Collected: 12/01/23 1311    Lab Status: Final result Specimen: Blood from Arm, Left Updated: 12/01/23 1321     WBC 6.78 Thousand/uL      RBC 4.68 Million/uL      Hemoglobin 12.3 g/dL      Hematocrit 38.1 %      MCV 81 fL      MCH 26.3 pg      MCHC 32.3 g/dL      RDW 13.8 %      Platelets 737 Thousands/uL      MPV 9.5 fL     Urine Microscopic [[id number]]  (Abnormal) Collected: 12/01/23 1053    Lab Status: Final result Specimen: Urine, Clean Catch Updated: 12/01/23 1315     RBC, UA 1-2 /hpf      WBC, UA 1-2 /hpf      Epithelial Cells Occasional /hpf      Bacteria, UA Occasional /hpf      MUCUS THREADS Occasional    hCG, quantitative [[id number]]  (Abnormal) Collected: 12/01/23 1038    Lab Status: Final result Specimen: Blood from Arm, Right Updated: 12/01/23 1152     HCG, Quant 6,295 mIU/mL     Narrative:       Expected Ranges:    HCG results between 5 and 25 mIU/mL may be indicative of early pregnancy but should be interpreted in light of the total clinical presentation. HCG can rise to detectable levels in yang and post menopausal women (0-11.6 mIU/mL).      Approximate Approximate HCG  Gestation age          Concentration ( mIU/mL)  _____________          ______________________   Clotilda Done                      HCG values  0.2-1                       5-50  1-2                           2-3                         100-5000  3-4                         500-11533  4-5                         1000-73843  5-6                         92718-135564  6-8                         19391-342801  8-12                        87113-271221      UA w Reflex to Microscopic w Reflex to Culture [[id number]]  (Abnormal) Collected: 12/01/23 1053    Lab Status: Final result Specimen: Urine, Clean Catch Updated: 12/01/23 1114     Color, UA Light Yellow     Clarity, UA Clear     Specific Gravity, UA 1.020     pH, UA 7.5     Leukocytes, UA Negative     Nitrite, UA Negative     Protein, UA Trace mg/dl      Glucose, UA Negative mg/dl      Ketones, UA Negative mg/dl      Urobilinogen, UA <2.0 mg/dl      Bilirubin, UA Negative     Occult Blood, UA Small    POCT pregnancy, urine [[id number]]  (Abnormal) Resulted: 12/01/23 1057    Lab Status: Final result Updated: 12/01/23 1057     EXT Preg Test, Ur Positive     Control Valid                   US OB < 14 weeks single or first gestation level 1   Final Result by Jonatan Garcia MD (12/01 1209)   Findings concerning for ectopic pregnancy. There is a hyperechoic structure in the right adnexa with a ring of vascularity, in the absence of an intrauterine gestational sac. I personally discussed this study with Uriel Seay on 12/1/2023 12:09 PM.            Workstation performed: [id number]               Procedures  POC Pelvic US    Date/Time: 12/1/2023 10:24 AM    Performed by: Alina Evans DO  Authorized by:  Alina Evans DO    Patient location:  ED  Procedure details:     Exam Type:  Educational    Indications: evaluate for IUP      Assessment for: confirm intrauterine pregnancy      Technique:  Transabdominal obstetric (HCG+) exam    Image quality: limited diagnostic      Image availability:  Images available in PACS  Uterine findings:     Endometrial stripe: not identified      Intrauterine pregnancy: not identified      Single gestation: not identified      Multiple gestation: not identified      Gestational sac: not identified      Yolk sac: not identified      Fetal pole: not identified      Fetal heart rate: not identified    Interpretation:     Ultrasound impressions: indeterminate      Pregnancy findings: indeterminate    Comments:      unable to visualize. ED Course  ED Course as of 12/01/23 1455   Fri Dec 01, 2023   1017 Blood Pressure: 162/80   1017 Temperature: 98.6 °F (37 °C)   1017 Temp Source: Temporal   1017 Pulse(!): 113   1017 Respirations: 16   1017 SpO2: 99 %   1025 - Bedside ultrasound unable to visualize gravid uterus  -Patient with last hCG levels in the 4000's  -Abdomen soft nontender nondistended  -Recently passed large amount of blood and some clots which could be indicative of miscarriage  -Menstrual period was November 8  -Will evaluate UA with reflex to culture for suprapubic pain in the setting of cystitis we will check an hCG tquant; check type and screen in the setting that this may be an ectopic and the patient needs to go to surgery and for RhoGAM if needed  -Will get formal point-of-care ultrasound to evaluate.  -Her boyfriend and her mother are aware they have no questions or concerns we will frequently reevaluate. Will consult Kody Alvarado as well. 1051 Urine at bedside to be dipped and sent   1106 PREGNANCY TEST URINE(!): Positive   1117 POCT URINE PROTEIN(!): Trace   1117 Blood, UA(!): Small   1207 On phone with radiology   9709 Structure in R adenexa; ring of vascularity superior to ovary. [address] to connect with OBGYN   1210 Patient and family aware of ectopic pregnancy.   Reached out to OB/GYN.   [address] states they will have someone evaluate   1455 3:30 OR Medical Decision Making  Amount and/or Complexity of Data Reviewed  Labs: ordered. Decision-making details documented in ED Course. Radiology: ordered. Risk  Prescription drug management. Decision regarding hospitalization. Disposition  Final diagnoses:   Ectopic pregnancy     Time reflects when diagnosis was documented in both MDM as applicable and the Disposition within this note       Time User Action Codes Description Comment    12/1/2023 12:08 PM Helena Douse Add [O00.90] Ectopic pregnancy           ED Disposition       ED Disposition   Send to OR    Condition   --    Date/Time   Fri Dec 1, 2023  2:55 PM    Comment   --             Follow-up Information       Follow up With Specialties Details Why [address] [place] [address] Obstetrics and Gynecology Schedule an appointment as soon as possible for a visit in 1 week(s)  [address] [facility] [address] [facility] [address]  [address], [address] [place] [address], [facility], [address] [facility]            Patient's Medications   Discharge Prescriptions    No medications on file     No discharge procedures on file. PDMP Review         Value Time User    PDMP Reviewed  Yes 4/6/2021  3:38 AM Violeta Gaston MD             ED Provider  Attending physically available and evaluated Amparo Francis. I managed the patient along with the ED Attending.     Electronically Signed by           Tyler Singh DO  12/01/23 4204

## 2023-12-01 NOTE — ASSESSMENT & PLAN NOTE
Barry Zazueta is a 22y female LMP 10/8[birth year]23 who was sent from [facility] for management of ectopic pregnancy. Patient hemodynamically stable, no evidence of rupture of ectopic pregnancy. Discussed medical management (methotrexate) versus surgical management with laparoscopic salpingectomy. Risks of methotrexate use include possible need for second dose of methotrexate, need for close office and laboratory follow-up, rupture, stomatitis, GI upset, tubal scarring. Risks of laparoscopic salpingectomy include bleeding, infection, damage to surrounding structures, future ectopic pregnancies. Discussed that fertility would not be compromised with one functional tube. Recommended HSG at some point in the future. Patient's mother shared concerns of methotrexate injection, due to anaphylactic reaction to COVID-19 vaccine in the past. We confirmed with pharmacy that the methotrexate injection does not contain the allergen that patient had allergic reaction to. After patient discussed with mother and partner, patient decided to proceed with surgical management. Consent signed for unilateral salpingectomy. Patient opted for hospital disposition for products of conception, and form signed. HCG, Quant   Date Value Ref Range Status   12/01/2023 6,295 (H) 0 - 5 mIU/mL Final        FINDINGS:     UTERUS:  The uterus is anteverted in position, measuring 6.2 x 3.4 x 5.8 cm. Contour and echotexture appear normal.  The cervix is closed and within normal limits. ENDOMETRIUM:  Endometrial stripe measures 5.0 mm. No evidence of intrauterine gestation. OVARIES/ADNEXA:  Within the right adnexa there is a 1.5 x 1.5 x 1.2 cm hyperechoic rounded structure with central anechoic focus and ring of color flow. This is located superior to the ovary. Trace pelvic free fluid. Right ovary:  2.8 x 2.2 x 1.2 cm. Volume 3.9  Doppler flow present. No suspicious abnormality. Left ovary:  2.3 x 1.7 x 2.4 cm.   Volume 4. 8  Doppler flow present. No suspicious abnormality. IMPRESSION:  Findings concerning for ectopic pregnancy. There is a hyperechoic structure in the right adnexa with a ring of vascularity, in the absence of an intrauterine gestational sac.     Plan:   - CBC, CMP, T&S  - Diet: NPO  - SCDs for DVT ppx  - proceed to OR for laparoscopic unilateral salpingectomy

## 2023-12-01 NOTE — DISCHARGE INSTR - AVS FIRST PAGE
Navid Cedeño,    Your ectopic pregnancy and right tube were removed surgically. The surgery went well and there were no complications. Please find instructions on how to take pain medicines as below. If you have any concerns, please call my office so we can get you in touch with the on-call doctor. I told your family - if you need to go to the ED for any reason, please go to the [facility], as that is where we have in house OBGYN coverage overnight. The [address] [place] [address] OBGYN office will call you for a post op appt in 2 weeks. Take care and call if you need anything at all,  Deepak Marcus    Post-Gynecologic Surgery Discharge Instructions:  1. No heavy lifting more than one full gallon of milk (about 8 lbs) for 1 week. 2. Nothing in the vagina for 6 weeks, or until cleared at your post-operative visit  3. You may take stairs one at a time, touching each step with both feet for the first few days, then as tolerated. 4. Call the office for fever greater than 100. 4'F, heavy vaginal bleeding, or increasing pain. 5. Activity as tolerated. 6. Avoid driving if taking narcotic pain medications (Roxicodone, Percocet, Vicodin). Post Operative Pain Management:  For pain, take 650 mg of tylenol every 6 hours. For cramping, take 600 mg Ibuprofen every 6 hours to relieve. You may alternate these and take them "around the clock" to stay ahead of pain. For example, take 650mg of tylenol at 9 AM, then 600mg of ibuprofen at 12 PM, then 650 of tylenol at 3 PM, and so forth. Post Operative Bowel Management:  Please take over the counter stool softener or laxative to ensure you do not strain, as the bowels are often the last thing to wake up from anesthesia. You can take whatever is preferred (colace, senna, or miralax)    If you have any questions regarding your prescriptions please call your doctor.

## 2023-12-01 NOTE — OP NOTE
OPERATIVE REPORT  PATIENT NAME: Dalila Escalante    :  2000  MRN: [id number]  Pt Location: BE OR ROOM 07    SURGERY DATE: 2023    Surgeon(s) and Role:     * Worth Klinefelter, DO - Primary     * Catherine Gilbert DO - Assisting     * Lynnette Resendiz MD - Assisting    Preop Diagnosis:  Ectopic pregnancy [O00.90]    Post-Op Diagnosis Codes:     * Ectopic pregnancy [O00.90]    Procedure(s):  Right - SALPINGECTOMY. LAPAROSCOPIC AND REMOVAL OF PERITONEAL LESION    Specimen(s):  ID Type Source Tests Collected by Time Destination   1 : Products of Conception and Right tube Tissue Products of Conception TISSUE EXAM Worth Klinefelter,  2023    2 : Peritoneal Lesion Tissue Peritoneum TISSUE EXAM Worth Klinefelter, DO 2023        Estimated Blood Loss:   Minimal    Drains:  Urethral Catheter Non-latex 16 Fr. (Active)   Number of days: 0       Anesthesia Type:   GETA    Operative Indications:    Brief History  Ms Dalila Escalante is a 26y.o. year old Lea Other presented to the emergency department with complaints of abdominal pain accompanied by scant vaginal bleeding. Quantitative beta hCG was performed and noted to be 6295. On ultrasound the following findings were noted:  Empty uterus, Within the right adnexa there is a 1.5 x 1.5 x 1.2 cm hyperechoic rounded structure with central anechoic focus and ring of color flow. This is located superior to the ovary. The patient was noted to be hemodynamically stable, but was tachycardic to 110s. She was counseled on surgical vs medical management, and decided to proceed with surgery. The patient was informed the risks and benefits of a diagnostic laparoscopy. The risks included, but were not limited to, bleeding, infection, injury to internal organs, possible blood transfusion, possible hysterectomy, and possible oophorectomy.   The patient expressed understanding of the risks involved, all questions were answered, the patient was consented to procedure. Operative Findings:  Normal appearing external female genitalia  No difficulty with laparoscopic entry  Normal-appearing uterus, soft and boggy, normal left tube, right tube with dilated tube consistent with ectopic pregnancy. There is also a small lesion blood clot and yellow appearing soft material over the anterior cul-de-sac which was suspicious for products of conception, this was removed and sent for routine pathology  Normal appearing liver and gallbladder, no evidence of Juanna Feather or endometriosis    Complications:   None      Operative Technique:   The operating room where timeout was performed to confirm correct patient and correct procedure. General anesthesia was established. SCDs were placed bilaterally, and the patient was positioned on the operating table in dorsal lithotomy position with legs put up in stirrups. All pressure points were padded and a Ovidio hugger was placed to maintain control of core body temperature. The patient was then prepped and draped in usual sterile fashion. Gloves were exchanged and attention was turned to the abdomen. A 10 mm vertical incision was made infraumbilical and a 10 mm trocar was introduced under direct visualization with the laparoscope within the trocar. When intra-abdominal placement had been confirmed, the trocar was removed, leaving sleeve in place. The laparoscope was then placed through the trocar and pneumoperitoneum was established to a maximum of 15 mmHg. The patient was placed in Trendelenburg. Subsequently, two additional small incisions were made and Two more ports (5mm, 5mm) were introduced under direct visualization. Based on the extent of damage to the fallopian tube, the decision was made to proceed with salpingectomy. The fallopian tube was grasped at the fimbriated end using the atraumatic grasper and the Enseal was used to coagulate and cut the fallopian tube proximal to the ectopic.   The fallopian tube was then coagulated and cut distal to the ectopic pregnancy. The portion of the fallopian tube containing the products of conception was then placed in an Endobag and removed and sent to pathology. The pelvis was thoroughly irrigated and good hemostasis was noted. The trocars were then removed. Pneumoperitoneum was evacuated and the skin was reapproximated using 4-0 Monocryl. Steri Strips were placed over the lateral port sites and a 2x2 with tegaderm was placed over the belly button. All sponge, instrument counts were noted to be correct ×2 at the end of the procedure. Dr. Art Wallace was present for all key portions of the procedure.       Patient Disposition:  PACU     The patient will follow up in the clinic in 2 weeks for a post operative appointment  I spoke with the family in the waiting room post operatively    SIGNATURE: Aliyah Fish DO  DATE: December 1, 2023  TIME: 5:23 PM

## 2023-12-02 PROBLEM — Z90.79 H/O UNILATERAL SALPINGECTOMY: Status: ACTIVE | Noted: 2023-12-02

## 2023-12-02 LAB
ABO GROUP BLD: NORMAL
BLD GP AB SCN SERPL QL: NEGATIVE
RH BLD: POSITIVE
SPECIMEN EXPIRATION DATE: NORMAL

## 2023-12-03 PROBLEM — J45.909 ASTHMA: Status: ACTIVE | Noted: 2023-12-03

## 2023-12-03 NOTE — ED ATTENDING ATTESTATION
12/1/2023  Chriss SNELL Done, DO, saw and evaluated the patient. I have discussed the patient with the resident/non-physician practitioner and agree with the resident's/non-physician practitioner's findings, Plan of Care, and MDM as documented in the resident's/non-physician practitioner's note, except where noted. All available labs and Radiology studies were reviewed. I was present for key portions of any procedure(s) performed by the resident/non-physician practitioner and I was immediately available to provide assistance. At this point I agree with the current assessment done in the Emergency Department. I have conducted an independent evaluation of this patient a history and physical is as follows:. Medical Decision Making  68-year-old female G1, P0 who presents with positive pregnancy test and unable to find an IUP on ultrasound at [facility] with noted increasing in hCGs,  Patient was sent in to rule out ectopic pregnancy the patient has no abdominal tenderness or pain at this point time. She is having some scant uterine bleeding    Bedside ultrasound shows no IUP, formal ultrasound shows noted evidence of concerning for ectopic pregnancy at this point time hCG noted. Differential includes ectopic pregnancy, miscarriage, threatened AB    Spoke with OB/GYN will take to the direct to the OR for management. Hemodynamically stable. Amount and/or Complexity of Data Reviewed  Labs: ordered. Radiology: ordered. Risk  Prescription drug management. Decision regarding hospitalization. All labs reviewed and interpreted by myself   All radiology studies independently viewed by me and interpreted by myself     US OB < 14 weeks single or first gestation level 1   Final Result   Findings concerning for ectopic pregnancy. There is a hyperechoic structure in the right adnexa with a ring of vascularity, in the absence of an intrauterine gestational sac.          I personally discussed this study with ANNETTE MARIA PALLADINO on 12/1/2023 12:09 PM.            Workstation performed: [id number]             Labs Reviewed   UA W REFLEX TO MICROSCOPIC WITH REFLEX TO CULTURE - Abnormal       Result Value Ref Range Status    Color, UA Light Yellow   Final    Clarity, UA Clear   Final    Specific Gravity, UA 1.020  1.003 - 1.030 Final    pH, UA 7.5  4.5, 5.0, 5.5, 6.0, 6.5, 7.0, 7.5, 8.0 Final    Leukocytes, UA Negative  Negative Final    Nitrite, UA Negative  Negative Final    Protein, UA Trace (*) Negative mg/dl Final    Glucose, UA Negative  Negative mg/dl Final    Ketones, UA Negative  Negative mg/dl Final    Urobilinogen, UA <2.0  <2.0 mg/dl mg/dl Final    Bilirubin, UA Negative  Negative Final    Occult Blood, UA Small (*) Negative Final   HCG, QUANTITATIVE - Abnormal    HCG, Quant 6,295 (*) 0 - 5 mIU/mL Final    Narrative:      Expected Ranges:    HCG results between 5 and 25 mIU/mL may be indicative of early pregnancy but should be interpreted in light of the total clinical presentation. HCG can rise to detectable levels in yang and post menopausal women (0-11.6 mIU/mL).      Approximate               Approximate HCG  Gestation age          Concentration ( mIU/mL)  _____________          ______________________   [facility]                      HCG values  0.2-1                       5-50  1-2                           2-3                         100-5000  3-4                         [id number]  4-5                         1000-49326  5-6                         [id number]  6-8                         [id number]  8-12                        [id number]     URINE MICROSCOPIC - Abnormal    RBC, UA 1-2  None Seen, 1-2 /hpf Final    WBC, UA 1-2  None Seen, 1-2 /hpf Final    Epithelial Cells Occasional  None Seen, Occasional /hpf Final    Bacteria, UA Occasional  None Seen, Occasional /hpf Final    MUCUS THREADS Occasional (*) None Seen Final   CBC AND PLATELET - Abnormal    WBC 6.78  4.31 - 10.16 Thousand/uL Final    RBC 4.68  3.81 - 5.12 Million/uL Final    Hemoglobin 12.3  11.5 - 15.4 g/dL Final    Hematocrit 38.1  34.8 - 46.1 % Final    MCV 81 (*) 82 - 98 fL Final    MCH 26.3 (*) 26.8 - 34.3 pg Final    MCHC 32.3  31.4 - 37.4 g/dL Final    RDW 13.8  11.6 - 15.1 % Final    Platelets 688  585 - 390 Thousands/uL Final    MPV 9.5  8.9 - 12.7 fL Final   COMPREHENSIVE METABOLIC PANEL - Abnormal    Sodium 138  135 - 147 mmol/L Final    Potassium 3.9  3.5 - 5.3 mmol/L Final    Chloride 109 (*) 96 - 108 mmol/L Final    CO2 20 (*) 21 - 32 mmol/L Final    ANION GAP 9  mmol/L Final    BUN 8  5 - 25 mg/dL Final    Creatinine 0.65  0.60 - 1.30 mg/dL Final    Comment: Standardized to IDMS reference method    Glucose 77  65 - 140 mg/dL Final    Comment: If the patient is fasting, the ADA then defines impaired fasting glucose as > 100 mg/dL and diabetes as > or equal to 123 mg/dL. Calcium 8.4  8.4 - 10.2 mg/dL Final    AST 15  13 - 39 U/L Final    ALT 12  7 - 52 U/L Final    Comment: Specimen collection should occur prior to Sulfasalazine administration due to the potential for falsely depressed results. Alkaline Phosphatase 34  34 - 104 U/L Final    Total Protein 6.2 (*) 6.4 - 8.4 g/dL Final    Albumin 4.1  3.5 - 5.0 g/dL Final    Total Bilirubin 0.71  0.20 - 1.00 mg/dL Final    Comment: Use of this assay is not recommended for patients undergoing treatment with eltrombopag due to the potential for falsely elevated results. N-acetyl-p-benzoquinone imine (metabolite of Acetaminophen) will generate erroneously low results in samples for patients that have taken an overdose of Acetaminophen.     eGFR 127  ml/min/1.73sq m Final    Narrative:     [place] guidelines for Chronic Kidney Disease (CKD):     Stage 1 with normal or high GFR (GFR > 90 mL/min/1.73 square meters)    Stage 2 Mild CKD (GFR = 60-89 mL/min/1.73 square meters)    Stage 3A Moderate CKD (GFR = 45-59 mL/min/1.73 square meters)    Stage 3B Moderate CKD (GFR = 30-44 mL/min/1.73 square meters)    Stage 4 Severe CKD (GFR = 15-29 mL/min/1.73 square meters)    Stage 5 End Stage CKD (GFR <15 mL/min/1.73 square meters)  Note: GFR calculation is accurate only with a steady state creatinine   POCT PREGNANCY, URINE - Abnormal    EXT Preg Test, Ur Positive (*)  Final    Control Valid   Final   ABO/RH    ABO Grouping O   Final    Rh Factor Positive   Final       Clinical Impression:    Final diagnoses:   Ectopic pregnancy         ED Course  ED Course as of 12/03/23 1231   Fri Dec 01, 2023   1219 uS noted at this time reached out OBGYN   Hemodynamically stable, no evidence of rupture at this time. Critical Care Time  CriticalCare Time    Date/Time: 12/3/2023 12:31 PM    Performed by: Mark Anthony Garcia DO  Authorized by: Mark Anthony Garcia DO    Critical care provider statement:     Critical care time (minutes):  60    Critical care time was exclusive of:  Separately billable procedures and treating other patients and teaching time    Critical care was necessary to treat or prevent imminent or life-threatening deterioration of the following conditions: Ectopic pregnancy requiring surgical management. Critical care was time spent personally by me on the following activities:  Blood draw for specimens, obtaining history from patient or surrogate, development of treatment plan with patient or surrogate, evaluation of patient's response to treatment, examination of patient, ordering and performing treatments and interventions, ordering and review of laboratory studies, ordering and review of radiographic studies, re-evaluation of patient's condition and review of old charts  Comments:      Upon my evaluation, this patient has a high probability of imminent or life-threatening deterioration due to ectopic pregnancy requiring surgical management which required my direct attention, intervention, and personal management.      I have personally provided 60 minutes of critical care time, exclusive of procedures, teaching, and any prior time recorded by providers other than myself. Time includes review of laboratory data, radiology results, discussion with consultants, and monitoring for potential decompensation.

## 2023-12-04 NOTE — ANESTHESIA PREPROCEDURE EVALUATION
Procedure:  SALPINGECTOMY, LAPAROSCOPIC AND REMOVAL OF PERITONEAL LESION (Right: Pelvis)    Relevant Problems   ANESTHESIA (within normal limits)      CARDIO (within normal limits)      ENDO (within normal limits)      GI/HEPATIC (within normal limits)      /RENAL (within normal limits)      GYN (within normal limits)      HEMATOLOGY (within normal limits)      MUSCULOSKELETAL (within normal limits)      NEURO/PSYCH (within normal limits)      PULMONARY   (+) Asthma      Respiratory   (+) Chronic seasonal allergic rhinitis due to pollen      Other   (+) Ectopic pregnancy without intrauterine pregnancy   Mother denies history of carnitine deficiency syndrome (seen in chart review). States that whole family was tested when patient was a child due to her other child having some problems and they all tested negative. Physical Exam    Airway    Mallampati score: II  TM Distance: >3 FB  Neck ROM: full     Dental   No notable dental hx     Cardiovascular  Cardiovascular exam normal    Pulmonary  Pulmonary exam normal     Other Findings  post-pubertal.      Anesthesia Plan  ASA Score- 2 Emergent    Anesthesia Type- general with ASA Monitors. Additional Monitors:     Airway Plan: ETT. Plan Factors-Exercise tolerance (METS): >4 METS. Chart reviewed. Existing labs reviewed. Patient summary reviewed. Induction- intravenous and rapid sequence induction. Postoperative Plan- . Planned trial extubation    Informed Consent- Anesthetic plan and risks discussed with patient and mother. I personally reviewed this patient with the CRNA. Discussed and agreed on the Anesthesia Plan with the CRNA. Anne Tinoco

## 2023-12-07 PROCEDURE — 88305 TISSUE EXAM BY PATHOLOGIST: CPT | Performed by: PATHOLOGY

## 2023-12-12 NOTE — PROGRESS NOTES
"OB/GYN VISIT  Janene Arroyo  2023  8:46 AM    Subjective:     Janene Arroyo is a 23 y.o.  female who presents for hospital follow-up visit.  She is new patient to the practice.    Janene typically receives her OB/GYN care through [facility], and she was noted to have an ectopic pregnancy, and was sent into the hospital.  She underwent right-sided salpingectomy.    A. Products of Conception and Right tube:  - Degenerating immature chorionic villi and decidua present in association with fallopian tube tissue consistent with ectopic pregnancy.  - No fetal tissue identified grossly or microscopically.  - Negative for malignancy.      B. Peritoneum, Peritoneal Lesion:  - Granulation tissue and fibrin debris.  - Negative for malignancy.      Past Medical History:   Diagnosis Date    Allergic     Allergic rhinitis     Anemia     Asthma     Eczema     Headache      Past Surgical History:   Procedure Laterality Date    [place] LAPAROSCOPY W/RMVL ADNEXAL STRUCTURES Right 2023    Procedure: SALPINGECTOMY, LAPAROSCOPIC AND REMOVAL OF PERITONEAL LESION;  Surgeon: Cuca Friedman DO;  [facility];  Service: Gynecology       Objective:    Vitals: Blood pressure 123/82, pulse 84, height 5' 2\" (1.575 m), weight 89.3 kg (196 lb 12.8 oz).Body mass index is 36 kg/m².    Physical Exam  HENT:      Head: Normocephalic.      Mouth/Throat:      Mouth: Mucous membranes are moist.   Cardiovascular:      Rate and Rhythm: Normal rate.   Abdominal:      General: Abdomen is flat.      Palpations: Abdomen is soft.      Comments: 3 lap port sites c/d/I  Steri strips removed from lateral port sites  Tegaderm with 2x2 removed from umbilicus   Neurological:      Mental Status: She is alert.           Assessment/Plan:    Ectopic Pregnancy  S/p right salpingectomy for tx of ectopic pregnancy 2023  Pathology reviewed  Incisions are healing well  Declines birth control, recommended prenatal vitamin with 400 " mg of folic acid  Discussed coming in for an early scan next time she has a positive pregnancy test  Her mom had asked about an HSG, given she has no risk factors for ectopic we will hold off at this time but could be considered in the future  Return to office for Pap smear and annual exam        Problem List Items Addressed This Visit       RESOLVED: Ectopic pregnancy without intrauterine pregnancy - Primary    RESOLVED: H/O unilateral salpingectomy       D/w Dr. Burton Hernandez, DO  12/19/2023  8:46 AM        Please note that while the recent CURES Act permits medical records be visible for patient review, clinical documentation is intended for utilization by healthcare professionals.  All test results are immediately available through [employer] as well, and we will review results at earliest opportunity and contact you with the appropriate urgency.  If you have a question about something you see in your chart, please don't hesitate to ask about it at your next appointment.

## 2023-12-19 ENCOUNTER — OFFICE VISIT (OUTPATIENT)
Dept: OBGYN CLINIC | Facility: CLINIC | Age: 23
End: 2023-12-19

## 2023-12-19 VITALS
HEART RATE: 84 BPM | SYSTOLIC BLOOD PRESSURE: 123 MMHG | HEIGHT: 62 IN | WEIGHT: 196.8 LBS | DIASTOLIC BLOOD PRESSURE: 82 MMHG | BODY MASS INDEX: 36.22 KG/M2

## 2023-12-19 DIAGNOSIS — O00.101 RIGHT TUBAL PREGNANCY WITHOUT INTRAUTERINE PREGNANCY: Primary | ICD-10-CM

## 2023-12-19 DIAGNOSIS — Z90.79 H/O UNILATERAL SALPINGECTOMY: ICD-10-CM

## 2023-12-19 PROBLEM — O00.90 ECTOPIC PREGNANCY WITHOUT INTRAUTERINE PREGNANCY: Status: RESOLVED | Noted: 2023-12-01 | Resolved: 2023-12-19

## 2023-12-19 PROCEDURE — 99203 OFFICE O/P NEW LOW 30 MIN: CPT | Performed by: OBSTETRICS & GYNECOLOGY

## 2023-12-19 NOTE — LETTER
"2023     Cuca Friedman DO  [address] [address]    Patient: Janene Arroyo   YOB: 2000   Date of Visit: 2023       Dear Dr. Friedman:    Thank you for referring Janene Arroyo to me for evaluation. Below are my notes for this consultation.    If you have questions, please do not hesitate to call me. I look forward to following your patient along with you.         Sincerely,        Gisselle Hernandez DO        CC: MD Gisselle Mckenzie DO  2023  8:46 AM  Incomplete  OB/GYN VISIT  Janene Arroyo  2023  8:46 AM    Subjective:     Janene Arroyo is a 23 y.o.  female who presents for hospital follow-up visit.  She is new patient to the practice.    Janene typically receives her OB/GYN care through [facility], and she was noted to have an ectopic pregnancy, and was sent into the hospital.  She underwent right-sided salpingectomy.    A. Products of Conception and Right tube:  - Degenerating immature chorionic villi and decidua present in association with fallopian tube tissue consistent with ectopic pregnancy.  - No fetal tissue identified grossly or microscopically.  - Negative for malignancy.      B. Peritoneum, Peritoneal Lesion:  - Granulation tissue and fibrin debris.  - Negative for malignancy.      Past Medical History:   Diagnosis Date   • Allergic    • Allergic rhinitis    • Anemia    • Asthma    • Eczema    • Headache      Past Surgical History:   Procedure Laterality Date   • [place] LAPAROSCOPY W/RMVL ADNEXAL STRUCTURES Right 2023    Procedure: SALPINGECTOMY, LAPAROSCOPIC AND REMOVAL OF PERITONEAL LESION;  Surgeon: Cuca Friedman DO;  [facility];  Service: Gynecology       Objective:    Vitals: Blood pressure 123/82, pulse 84, height 5' 2\" (1.575 m), weight 89.3 kg (196 lb 12.8 oz).Body mass index is 36 kg/m².    Physical Exam  HENT:      Head: Normocephalic.      Mouth/Throat:      " Anticoagulation Summary  As of 8/31/2018    INR goal:   2.0-3.0   TTR:   43.4 % (3.6 wk)   Today's INR:   2.8   Warfarin maintenance plan:   5 mg (5 mg x 1) on Fri; 7.5 mg (5 mg x 1.5) all other days   Weekly warfarin total:   50 mg   Plan last modified:   Piper Pedroza, PharmD (8/24/2018)   Next INR check:   9/14/2018   Target end date:   Indefinite    Indications    Atrial fibrillation with rapid ventricular response (HCC) [I48.91]             Anticoagulation Episode Summary     INR check location:       Preferred lab:       Send INR reminders to:       Comments:   self pay patient      Anticoagulation Care Providers     Provider Role Specialty Phone number    Ruel Duran M.D. Referring Internal Medicine [phone]    [facility] Responsible  [phone]    Shashi Lawler, PharmD Responsible          Anticoagulation Patient Findings      Spoke with patient to report a therapeutic INR.    Pt instructed to continue with current warfarin dosing regimen, confirms dosing.   Pt denies any s/s of bleeding, bruising, clotting or any changes to diet or medication.    Will follow up in 2 week(s).     Piper Pedroza, PharmD     Mouth: Mucous membranes are moist.   Cardiovascular:      Rate and Rhythm: Normal rate.   Abdominal:      General: Abdomen is flat.      Palpations: Abdomen is soft.      Comments: 3 lap port sites c/d/I  Steri strips removed from lateral port sites  Tegaderm with 2x2 removed from umbilicus   Neurological:      Mental Status: She is alert.           Assessment/Plan:    Ectopic Pregnancy  S/p right salpingectomy for tx of ectopic pregnancy 2023  Pathology reviewed  Incisions are healing well  Declines birth control, recommended prenatal vitamin with 400 mg of folic acid  Discussed coming in for an early scan next time she has a positive pregnancy test  Her mom had asked about an HSG, given she has no risk factors for ectopic we will hold off at this time but could be considered in the future  Return to office for Pap smear and annual exam        Problem List Items Addressed This Visit       RESOLVED: Ectopic pregnancy without intrauterine pregnancy - Primary    RESOLVED: H/O unilateral salpingectomy       D/w Dr. Burton Hernandez,   2023  8:46 AM        Please note that while the recent CURES Act permits medical records be visible for patient review, clinical documentation is intended for utilization by healthcare professionals.  All test results are immediately available through [employer] as well, and we will review results at earliest opportunity and contact you with the appropriate urgency.  If you have a question about something you see in your chart, please don't hesitate to ask about it at your next appointment.       Gisselle Hernandez DO  2023  8:45 AM  Sign when Signing Visit  OB/GYN VISIT  Janene Arroyo  2023  8:43 AM    Subjective:     Janene Arroyo is a 23 y.o.  female who presents for hospital follow-up visit.  She is new patient to the practice.    Janene typically receives her OB/GYN care through [facility], and she was noted to have an  "ectopic pregnancy, and was sent into the hospital.  She underwent right-sided salpingectomy.    A. Products of Conception and Right tube:  - Degenerating immature chorionic villi and decidua present in association with fallopian tube tissue consistent with ectopic pregnancy.  - No fetal tissue identified grossly or microscopically.  - Negative for malignancy.      B. Peritoneum, Peritoneal Lesion:  - Granulation tissue and fibrin debris.  - Negative for malignancy.      Past Medical History:   Diagnosis Date   • Allergic    • Allergic rhinitis    • Anemia    • Asthma    • Eczema    • Headache      Past Surgical History:   Procedure Laterality Date   • [place] LAPAROSCOPY W/RMVL ADNEXAL STRUCTURES Right 12/1/2023    Procedure: SALPINGECTOMY, LAPAROSCOPIC AND REMOVAL OF PERITONEAL LESION;  Surgeon: Cuca Friedman DO;  [facility];  Service: Gynecology       Objective:    Vitals: Blood pressure 123/82, pulse 84, height 5' 2\" (1.575 m), weight 89.3 kg (196 lb 12.8 oz).Body mass index is 36 kg/m².    Physical Exam  HENT:      Head: Normocephalic.      Mouth/Throat:      Mouth: Mucous membranes are moist.   Cardiovascular:      Rate and Rhythm: Normal rate.   Abdominal:      General: Abdomen is flat.      Palpations: Abdomen is soft.      Comments: 3 lap port sites c/d/I  Steri strips removed from lateral port sites  Tegaderm with 2x2 removed from umbilicus   Neurological:      Mental Status: She is alert.           Assessment/Plan:    Ectopic Pregnancy  S/p right salpingectomy for tx of ectopic pregnancy 12/1/2023  Pathology reviewed  Incisions are healing well  Declines birth control, recommended prenatal vitamin with 400 mg of folic acid  Discussed coming in for an early scan next time she has a positive pregnancy test  Her mom had asked about an HSG, given she has no risk factors for ectopic we will hold off at this time but could be considered in the future  Return to office for Pap smear and annual " exam        Problem List Items Addressed This Visit       RESOLVED: Ectopic pregnancy without intrauterine pregnancy - Primary    RESOLVED: H/O unilateral salpingectomy       D/w Dr. Burton Hernandez, DO  12/19/2023  8:43 AM        Please note that while the recent CURES Act permits medical records be visible for patient review, clinical documentation is intended for utilization by healthcare professionals.  All test results are immediately available through [employer] as well, and we will review results at earliest opportunity and contact you with the appropriate urgency.  If you have a question about something you see in your chart, please don't hesitate to ask about it at your next appointment.

## 2024-01-08 NOTE — PROGRESS NOTES
"OB/GYN VISIT  Janene Arroyo  2024  9:10 AM    Subjective:    Janene Arroyo is a 23 y.o.  female who presents for annual well woman exam.      Concerns today: none    Review of Systems  Amenorrheic since ectopic, not on birth control  Denies vaginal discharge, labial erythema or lesions, dyspareunia.  Sexually active: no, but previously with men  Current contraception: abstinence  History of abnormal Pap smear: no  Family history of breast, endometrial, uterine or ovarian cancer: no  History of abnormal mammogram: no  STI hx: none  GYN surgeries: salpingectomy for ectopic     Vaccines:  Gardasil (HPV) vaccine: unsure    COVID Vaccine: had severe allergic reaction to vaccine    Screening  Cervical Cancer Screening:  first pap today  Breast Cancer Screening: n/a  Colon Cancer Screening: n/a    Health Maintenance:    She exercises 1 days per week.  She does perform breast self-awareness   She feels safe at home.   She does follow a well balanced diet.    She does not use tobacco      OB History    Para Term  AB Living   1       1     SAB IAB Ectopic Multiple Live Births       1          # Outcome Date GA Lbr Jean/2nd Weight Sex Delivery Anes PTL Lv   1 Ectopic 23              Birth Comments: salpingectomy       Past Medical History:   Diagnosis Date    Allergic     Allergic rhinitis     Anemia     Asthma     Eczema     Headache        Past Surgical History:   Procedure Laterality Date    [place] LAPAROSCOPY W/RMVL ADNEXAL STRUCTURES Right 2023    Procedure: SALPINGECTOMY, LAPAROSCOPIC AND REMOVAL OF PERITONEAL LESION;  Surgeon: Cuca Friedman DO;  [facility];  Service: Gynecology        Objective:  /73   Pulse 94   Ht 5' 2\" (1.575 m)   Wt 88.9 kg (196 lb)   LMP  (LMP Unknown)   BMI 35.85 kg/m²  Body mass index is 35.85 kg/m².     Physical Exam:  GEN: The patient was alert and oriented x3, pleasant well-appearing female in no acute distress.   HEENT:  " Unremarkable, no anterior or posterior lymphadenopathy, no thyromegaly  CV:  Regular rate   RESP:  Unlabored breathing  BREAST:  Declined exam  GI:  Soft, nontender, non-distended  MSK: bilateral lower extremities are nontender, no edema  : Normal appearing external female genitalia, normal appearing urethral meatus. On sterile speculum exam,  normal appearing vaginal epithelium, no vaginal discharge, no bleeding, grossly normal appearing cervix, small and nulliparous. On bimanual exam,  no cervical motion tenderness; uterus is smooth, mobile, nontender. No tenderness or fullness in the bilateral adnexa. There is a small abrasion on her groin which she reports is from getting her skin stuck on a chair       ASSESSMENT/PLAN: Janene Arroyo is a 23 y.o.  who presents for annual gynecologic exam.    1.  Routine well woman exam done today.  2.  Pap and HPV:Pap with reflex to HPV if abnormal was done today.  Current ASCCP Guidelines reviewed.   3.  The patient desired STD testing.  chlamydia and gonorrhea testing performed. Safe sex practices have been discussed.  4. The patient is not sexually active. She is not interested in contraception at the moment  5. The following were reviewed in today's visit: STD testing, family planning choices, exercise, and healthy diet.  6. Patient to return to office in 12 months for annual.         Problem List Items Addressed This Visit    None  Visit Diagnoses       Encounter for gynecological examination without abnormal finding    -  Primary    Relevant Orders    Liquid-based pap, screening    Chlamydia/GC amplified DNA by PCR    Screen for STD (sexually transmitted disease)        Relevant Orders    Liquid-based pap, screening    Chlamydia/GC amplified DNA by PCR              D/w Dr. Best Hernandez, DO  Obstetrics & Gynecology PGY-4  2024  9:10 AM

## 2024-01-11 ENCOUNTER — ANNUAL EXAM (OUTPATIENT)
Dept: OBGYN CLINIC | Facility: CLINIC | Age: 24
End: 2024-01-11

## 2024-01-11 VITALS
HEIGHT: 62 IN | HEART RATE: 94 BPM | DIASTOLIC BLOOD PRESSURE: 73 MMHG | SYSTOLIC BLOOD PRESSURE: 124 MMHG | BODY MASS INDEX: 36.07 KG/M2 | WEIGHT: 196 LBS

## 2024-01-11 DIAGNOSIS — Z11.3 SCREEN FOR STD (SEXUALLY TRANSMITTED DISEASE): ICD-10-CM

## 2024-01-11 DIAGNOSIS — Z01.419 ENCOUNTER FOR GYNECOLOGICAL EXAMINATION WITHOUT ABNORMAL FINDING: Primary | ICD-10-CM

## 2024-01-11 PROCEDURE — 87491 CHLMYD TRACH DNA AMP PROBE: CPT | Performed by: OBSTETRICS & GYNECOLOGY

## 2024-01-11 PROCEDURE — S0612 ANNUAL GYNECOLOGICAL EXAMINA: HCPCS | Performed by: OBSTETRICS & GYNECOLOGY

## 2024-01-11 PROCEDURE — G0145 SCR C/V CYTO,THINLAYER,RESCR: HCPCS | Performed by: OBSTETRICS & GYNECOLOGY

## 2024-01-11 PROCEDURE — 87591 N.GONORRHOEAE DNA AMP PROB: CPT | Performed by: OBSTETRICS & GYNECOLOGY

## 2024-01-11 RX ORDER — FERROUS SULFATE 325(65) MG
1 TABLET ORAL DAILY
COMMUNITY
Start: 2023-12-08

## 2024-01-13 LAB
C TRACH DNA SPEC QL NAA+PROBE: NEGATIVE
N GONORRHOEA DNA SPEC QL NAA+PROBE: NEGATIVE

## 2024-01-16 LAB
LAB AP GYN PRIMARY INTERPRETATION: NORMAL
Lab: NORMAL

## 2024-01-22 ENCOUNTER — TELEPHONE (OUTPATIENT)
Dept: OBGYN CLINIC | Facility: CLINIC | Age: 24
End: 2024-01-22

## 2024-01-22 NOTE — TELEPHONE ENCOUNTER
Called pt to give results of pap smear, pt did not answer, left vm for her to give us a call back at our office

## 2024-03-09 ENCOUNTER — HOSPITAL ENCOUNTER (EMERGENCY)
Facility: HOSPITAL | Age: 24
Discharge: HOME | End: 2024-03-09
Attending: EMERGENCY MEDICINE
Payer: COMMERCIAL

## 2024-03-09 ENCOUNTER — APPOINTMENT (EMERGENCY)
Dept: RADIOLOGY | Facility: HOSPITAL | Age: 24
End: 2024-03-09
Payer: COMMERCIAL

## 2024-03-09 VITALS
TEMPERATURE: 98.1 F | OXYGEN SATURATION: 98 % | DIASTOLIC BLOOD PRESSURE: 85 MMHG | RESPIRATION RATE: 24 BRPM | HEART RATE: 145 BPM | SYSTOLIC BLOOD PRESSURE: 141 MMHG

## 2024-03-09 DIAGNOSIS — J06.9 VIRAL URI WITH COUGH: Primary | ICD-10-CM

## 2024-03-09 PROCEDURE — 94640 AIRWAY INHALATION TREATMENT: CPT

## 2024-03-09 PROCEDURE — 71046 X-RAY EXAM CHEST 2 VIEWS: CPT

## 2024-03-09 PROCEDURE — 99284 EMERGENCY DEPT VISIT MOD MDM: CPT | Performed by: EMERGENCY MEDICINE

## 2024-03-09 PROCEDURE — 99283 EMERGENCY DEPT VISIT LOW MDM: CPT

## 2024-03-09 RX ORDER — GUAIFENESIN 600 MG/1
600 TABLET, EXTENDED RELEASE ORAL ONCE
Status: COMPLETED | OUTPATIENT
Start: 2024-03-09 | End: 2024-03-09

## 2024-03-09 RX ORDER — OXYMETAZOLINE HYDROCHLORIDE 0.05 G/100ML
2 SPRAY NASAL ONCE
Status: COMPLETED | OUTPATIENT
Start: 2024-03-09 | End: 2024-03-09

## 2024-03-09 RX ORDER — ALBUTEROL SULFATE 2.5 MG/3ML
5 SOLUTION RESPIRATORY (INHALATION) ONCE
Status: COMPLETED | OUTPATIENT
Start: 2024-03-09 | End: 2024-03-09

## 2024-03-09 RX ORDER — LEVOCETIRIZINE DIHYDROCHLORIDE 5 MG/1
5 TABLET, FILM COATED ORAL EVERY EVENING
Qty: 30 TABLET | Refills: 0 | Status: SHIPPED | OUTPATIENT
Start: 2024-03-09

## 2024-03-09 RX ORDER — BENZONATATE 100 MG/1
100 CAPSULE ORAL ONCE
Status: COMPLETED | OUTPATIENT
Start: 2024-03-09 | End: 2024-03-09

## 2024-03-09 RX ORDER — BENZONATATE 100 MG/1
100 CAPSULE ORAL EVERY 8 HOURS
Qty: 21 CAPSULE | Refills: 0 | Status: SHIPPED | OUTPATIENT
Start: 2024-03-09

## 2024-03-09 RX ADMIN — BENZONATATE 100 MG: 100 CAPSULE ORAL at 23:12

## 2024-03-09 RX ADMIN — IPRATROPIUM BROMIDE 0.5 MG: 0.5 SOLUTION RESPIRATORY (INHALATION) at 22:16

## 2024-03-09 RX ADMIN — GUAIFENESIN 600 MG: 600 TABLET, EXTENDED RELEASE ORAL at 22:16

## 2024-03-09 RX ADMIN — OXYMETAZOLINE HYDROCHLORIDE 2 SPRAY: 0.05 SPRAY NASAL at 22:16

## 2024-03-09 RX ADMIN — DEXAMETHASONE 6 MG: 2 TABLET ORAL at 22:16

## 2024-03-09 RX ADMIN — ALBUTEROL SULFATE 5 MG: 2.5 SOLUTION RESPIRATORY (INHALATION) at 22:16

## 2024-03-10 NOTE — DISCHARGE INSTRUCTIONS
Try using tessalon perles, mucinex, xyzal for cough, post nasal drip. You can use honey before bed to help sooth the throat. Follow up with your PCP as needed.     If you develop new or worsening symptoms, please return to the Emergency Department for further evaluation.

## 2024-03-10 NOTE — ED ATTENDING ATTESTATION
3/9/2024  I, Kevin Maddox DO, saw and evaluated the patient. I have discussed the patient with the resident/non-physician practitioner and agree with the resident's/non-physician practitioner's findings, Plan of Care, and MDM as documented in the resident's/non-physician practitioner's note, except where noted. All available labs and Radiology studies were reviewed.  I was present for key portions of any procedure(s) performed by the resident/non-physician practitioner and I was immediately available to provide assistance.       At this point I agree with the current assessment done in the Emergency Department.  I have conducted an independent evaluation of this patient a history and physical is as follows:    23-year-old female complains of nasal congestion, postnasal drip, sore throat and cough productive of clear sputum feels like her throat closes when she coughs then goes away.  Denies fever, shortness of breath, chest pain, leg pain or swelling, rash.    Posterior pharynx with some cobblestoning consistent with postnasal drip.  Boggy turbinates bilaterally with clear nasal discharge  Lungs clear to auscultation bilaterally  Normal phonation  No stridor    Impression: Nasal congestion likely URI versus allergies plan: Decongestants, symptomatic treatment, reassess likely discharge home with supportive care.      ED Course         Critical Care Time  Procedures

## 2024-03-10 NOTE — ED PROVIDER NOTES
History  Chief Complaint   Patient presents with    Asthma     Began about half hour ago. Rescue inhaler didn't help. Cough and SOB     HPI    Patient is a 24 y/o F with pmh asthma presenting with cough, congestion, difficulty breathing. Pt accompanied by parents. They state she's had a URI for the past few days, runny nose now congestion, frequent cough. Patient was feeling more acute SOB tonight, using rescue inhaler without improvement. Feels like the coughing is too frequent and gets the feeling she can't breathe. Hx of mild asthma only uses albuterol prn, no daily medications, no hospitalizations for asthma. Denies fever, n/v/d, abd pain.     Prior to Admission Medications   Prescriptions Last Dose Informant Patient Reported? Taking?   EPINEPHrine (EPIPEN JR) 0.15 mg/0.3 mL SOAJ   Yes No   Sig: Inject 0.15 mg into a muscle once   albuterol (2.5 mg/3 mL) 0.083 % nebulizer solution   No No   Sig: Take 1 vial (2.5 mg total) by nebulization every 6 (six) hours as needed for wheezing or shortness of breath   albuterol (ACCUNEB) 0.63 MG/3ML nebulizer solution   Yes No   Sig: Take 1 ampule by nebulization every 4 (four) hours as needed for wheezing   albuterol (PROVENTIL HFA,VENTOLIN HFA) 90 mcg/act inhaler   Yes No   Sig: Inhale 2 puffs every 4 (four) hours as needed for wheezing   albuterol (Ventolin HFA) 90 mcg/act inhaler   No No   Sig: Inhale 2 puffs every 6 (six) hours as needed for wheezing   budesonide-formoterol (SYMBICORT) 80-4.5 MCG/ACT inhaler   No No   Sig: Inhale 2 puffs 2 (two) times a day Rinse mouth after use.   Patient not taking: Reported on 12/19/2023   ferrous sulfate 325 (65 Fe) mg tablet   Yes No   Sig: Take 1 tablet by mouth daily   ibuprofen (MOTRIN) 600 mg tablet   No No   Sig: Take 1 tablet (600 mg total) by mouth every 6 (six) hours as needed for mild pain      Facility-Administered Medications: None       Past Medical History:   Diagnosis Date    Allergic     Allergic rhinitis     Anemia      Asthma     Eczema     Headache        Past Surgical History:   Procedure Laterality Date    [place] LAPAROSCOPY W/RMVL ADNEXAL STRUCTURES Right 12/1/2023    Procedure: SALPINGECTOMY, LAPAROSCOPIC AND REMOVAL OF PERITONEAL LESION;  Surgeon: Cuca Friedman DO;  Location: [facility];  Service: Gynecology       Family History   Problem Relation Age of Onset    Sjogren's syndrome Mother     Asthma Mother     Allergies Mother     No Known Problems Father     No Known Problems Brother     No Known Problems Brother     No Known Problems Brother      I have reviewed and agree with the history as documented.    E-Cigarette/Vaping    E-Cigarette Use Never User      E-Cigarette/Vaping Substances    Nicotine No     THC No     CBD No     Flavoring No     Other No     Unknown No      Social History     Tobacco Use    Smoking status: Never    Smokeless tobacco: Never   Vaping Use    Vaping status: Never Used   Substance Use Topics    Alcohol use: No    Drug use: No        Review of Systems   Constitutional:  Negative for chills and fever.   HENT:  Positive for congestion and rhinorrhea. Negative for ear pain and sore throat.    Eyes:  Negative for pain and visual disturbance.   Respiratory:  Positive for cough and shortness of breath. Negative for chest tightness and wheezing.    Cardiovascular:  Negative for chest pain and palpitations.   Gastrointestinal:  Negative for abdominal pain and vomiting.   Genitourinary:  Negative for dysuria and hematuria.   Musculoskeletal:  Negative for arthralgias and back pain.   Skin:  Negative for color change and rash.   Neurological:  Negative for seizures and syncope.   All other systems reviewed and are negative.      Physical Exam  ED Triage Vitals   Temperature Pulse Respirations Blood Pressure SpO2   03/09/24 2149 03/09/24 2149 03/09/24 2150 03/09/24 2150 03/09/24 2149   98.1 °F (36.7 °C) (!) 145 (!) 24 141/85 98 %      Temp Source Heart Rate Source Patient Position - Orthostatic VS BP  Location FiO2 (%)   03/09/24 2149 03/09/24 2150 03/09/24 2150 03/09/24 2150 --   Temporal Monitor Sitting Left arm       Pain Score       --                    Orthostatic Vital Signs  Vitals:    03/09/24 2149 03/09/24 2150   BP:  141/85   Pulse: (!) 145    Patient Position - Orthostatic VS:  Sitting       Physical Exam  Vitals and nursing note reviewed.   Constitutional:       General: She is not in acute distress.  HENT:      Head: Normocephalic and atraumatic.      Right Ear: External ear normal.      Left Ear: External ear normal.      Nose: Nose normal.      Mouth/Throat:      Pharynx: Oropharynx is clear. No oropharyngeal exudate or posterior oropharyngeal erythema.   Eyes:      Extraocular Movements: Extraocular movements intact.      Pupils: Pupils are equal, round, and reactive to light.   Cardiovascular:      Rate and Rhythm: Regular rhythm. Tachycardia present.      Pulses: Normal pulses.      Heart sounds: Normal heart sounds. No murmur heard.     No friction rub. No gallop.   Pulmonary:      Effort: Pulmonary effort is normal. No respiratory distress.      Breath sounds: Normal breath sounds. No wheezing, rhonchi or rales.   Abdominal:      General: Abdomen is flat. There is no distension.      Palpations: Abdomen is soft.      Tenderness: There is no abdominal tenderness. There is no guarding or rebound.   Musculoskeletal:         General: No deformity. Normal range of motion.      Cervical back: Normal range of motion.      Right lower leg: No edema.      Left lower leg: No edema.   Skin:     General: Skin is warm and dry.      Capillary Refill: Capillary refill takes less than 2 seconds.      Findings: No rash.   Neurological:      General: No focal deficit present.      Mental Status: She is alert and oriented to person, place, and time.      Gait: Gait normal.   Psychiatric:         Mood and Affect: Mood normal.         ED Medications  Medications   albuterol inhalation solution 5 mg (5 mg  Nebulization Given 3/9/24 2216)   ipratropium (ATROVENT) 0.02 % inhalation solution 0.5 mg (0.5 mg Nebulization Given 3/9/24 2216)   guaiFENesin (MUCINEX) 12 hr tablet 600 mg (600 mg Oral Given 3/9/24 2216)   oxymetazoline (AFRIN) 0.05 % nasal spray 2 spray (2 sprays Each Nare Given 3/9/24 2216)   dexamethasone (DECADRON) tablet 6 mg (6 mg Oral Given 3/9/24 2216)   benzonatate (TESSALON PERLES) capsule 100 mg (100 mg Oral Given 3/9/24 2312)       Diagnostic Studies  Results Reviewed       None                   XR chest 2 views   ED Interpretation by Vicente Escalona MD (03/09 2259)   No acute cardiopulmonary process per my interpretation       Final Result by Aliyah Rios MD (03/10 0541)      No acute cardiopulmonary disease.            Workstation performed: [id number]               Procedures  Procedures      ED Course                                       Medical Decision Making  Well appearing 24 y/o F presenting with shortness of breath, cough. Mild tachycardia and tachypnea on exam. Pt appears somewhat anxious. She has clear lung sounds b/l and frequent coughing on exam. Will trial duoneb, 1x dexamethasone for possible asthma exacerbation. CXR obtained w/o evidence of pneumonia. Pt re-assessed, still no wheezing. Doubt asthma exacerbation. Most likely viral URI with cough. Recommend supportive care, expectant management. RTED precautions given and pt discharged.     Amount and/or Complexity of Data Reviewed  Radiology: ordered and independent interpretation performed.    Risk  OTC drugs.  Prescription drug management.          Disposition  Final diagnoses:   Viral URI with cough     Time reflects when diagnosis was documented in both MDM as applicable and the Disposition within this note       Time User Action Codes Description Comment    3/9/2024 10:59 PM Vicente Escalona Add [J06.9] Viral URI with cough           ED Disposition       ED Disposition   Discharge    Condition   Stable    Date/Time   Sat  Mar 9, 2024 10:59 PM    Comment   Janene Arroyo discharge to home/self care.                   Follow-up Information       Follow up With Specialties Details Why Contact Info    Samuel Robles MD Family Medicine Schedule an appointment as soon as possible for a visit   [address] [place] [address]  [phone]              Discharge Medication List as of 3/9/2024 11:02 PM        START taking these medications    Details   benzonatate (TESSALON PERLES) 100 mg capsule Take 1 capsule (100 mg total) by mouth every 8 (eight) hours, Starting Sat 3/9/2024, Normal      dextromethorphan-guaifenesin (MUCINEX DM)  MG per 12 hr tablet Take 1 tablet by mouth every 12 (twelve) hours, Starting Sat 3/9/2024, Normal      levocetirizine (XYZAL) 5 MG tablet Take 1 tablet (5 mg total) by mouth every evening, Starting Sat 3/9/2024, Normal           CONTINUE these medications which have NOT CHANGED    Details   albuterol (2.5 mg/3 mL) 0.083 % nebulizer solution Take 1 vial (2.5 mg total) by nebulization every 6 (six) hours as needed for wheezing or shortness of breath, Starting Tue 4/6/2021, Normal      albuterol (ACCUNEB) 0.63 MG/3ML nebulizer solution Take 1 ampule by nebulization every 4 (four) hours as needed for wheezing, Historical Med      !! albuterol (PROVENTIL HFA,VENTOLIN HFA) 90 mcg/act inhaler Inhale 2 puffs every 4 (four) hours as needed for wheezing, Historical Med      !! albuterol (Ventolin HFA) 90 mcg/act inhaler Inhale 2 puffs every 6 (six) hours as needed for wheezing, Starting Wed 4/21/2021, Normal      budesonide-formoterol (SYMBICORT) 80-4.5 MCG/ACT inhaler Inhale 2 puffs 2 (two) times a day Rinse mouth after use., Starting Mon 4/26/2021, Until Tue 4/26/2022, Normal      EPINEPHrine (EPIPEN JR) 0.15 mg/0.3 mL SOAJ Inject 0.15 mg into a muscle once, Historical Med      ferrous sulfate 325 (65 Fe) mg tablet Take 1 tablet by mouth daily, Starting Fri 12/8/2023, Historical Med       ibuprofen (MOTRIN) 600 mg tablet Take 1 tablet (600 mg total) by mouth every 6 (six) hours as needed for mild pain, Starting Fri 12/1/2023, Normal       !! - Potential duplicate medications found. Please discuss with provider.        No discharge procedures on file.    PDMP Review         Value Time User    PDMP Reviewed  Yes 4/6/2021  3:38 AM Forrest Mg MD             ED Provider  Attending physically available and evaluated Janene Arroyo. I managed the patient along with the ED Attending.    Electronically Signed by           Vicente Escalona MD  03/10/24 9528

## 2024-07-25 ENCOUNTER — HOSPITAL ENCOUNTER (EMERGENCY)
Facility: HOSPITAL | Age: 24
Discharge: HOME | End: 2024-07-25
Attending: EMERGENCY MEDICINE
Payer: COMMERCIAL

## 2024-07-25 ENCOUNTER — APPOINTMENT (EMERGENCY)
Dept: RADIOLOGY | Facility: HOSPITAL | Age: 24
End: 2024-07-25
Payer: COMMERCIAL

## 2024-07-25 VITALS
OXYGEN SATURATION: 99 % | DIASTOLIC BLOOD PRESSURE: 90 MMHG | SYSTOLIC BLOOD PRESSURE: 170 MMHG | TEMPERATURE: 98.3 F | RESPIRATION RATE: 18 BRPM | HEART RATE: 106 BPM

## 2024-07-25 DIAGNOSIS — N83.201 BILATERAL OVARIAN CYSTS: ICD-10-CM

## 2024-07-25 DIAGNOSIS — R10.9 ABDOMINAL PAIN: Primary | ICD-10-CM

## 2024-07-25 DIAGNOSIS — N83.202 BILATERAL OVARIAN CYSTS: ICD-10-CM

## 2024-07-25 LAB
ALBUMIN SERPL BCG-MCNC: 4 G/DL (ref 3.5–5)
ALP SERPL-CCNC: 39 U/L (ref 34–104)
ALT SERPL W P-5'-P-CCNC: 10 U/L (ref 7–52)
ANION GAP SERPL CALCULATED.3IONS-SCNC: 5 MMOL/L (ref 4–13)
AST SERPL W P-5'-P-CCNC: 13 U/L (ref 13–39)
BASOPHILS # BLD AUTO: 0.02 THOUSANDS/ÂΜL (ref 0–0.1)
BASOPHILS NFR BLD AUTO: 0 % (ref 0–1)
BILIRUB SERPL-MCNC: 0.38 MG/DL (ref 0.2–1)
BILIRUB UR QL STRIP: NEGATIVE
BUN SERPL-MCNC: 9 MG/DL (ref 5–25)
CALCIUM SERPL-MCNC: 8.7 MG/DL (ref 8.4–10.2)
CHLORIDE SERPL-SCNC: 108 MMOL/L (ref 96–108)
CLARITY UR: CLEAR
CO2 SERPL-SCNC: 25 MMOL/L (ref 21–32)
COLOR UR: NORMAL
CREAT SERPL-MCNC: 0.72 MG/DL (ref 0.6–1.3)
EOSINOPHIL # BLD AUTO: 0.1 THOUSAND/ÂΜL (ref 0–0.61)
EOSINOPHIL NFR BLD AUTO: 1 % (ref 0–6)
ERYTHROCYTE [DISTWIDTH] IN BLOOD BY AUTOMATED COUNT: 14.1 % (ref 11.6–15.1)
EXT PREGNANCY TEST URINE: NEGATIVE
EXT. CONTROL: NORMAL
GFR SERPL CREATININE-BSD FRML MDRD: 120 ML/MIN/1.73SQ M
GLUCOSE SERPL-MCNC: 79 MG/DL (ref 65–140)
GLUCOSE UR STRIP-MCNC: NEGATIVE MG/DL
HCT VFR BLD AUTO: 39.2 % (ref 34.8–46.1)
HGB BLD-MCNC: 11.9 G/DL (ref 11.5–15.4)
HGB UR QL STRIP.AUTO: NEGATIVE
IMM GRANULOCYTES # BLD AUTO: 0.01 THOUSAND/UL (ref 0–0.2)
IMM GRANULOCYTES NFR BLD AUTO: 0 % (ref 0–2)
KETONES UR STRIP-MCNC: NEGATIVE MG/DL
LEUKOCYTE ESTERASE UR QL STRIP: NEGATIVE
LYMPHOCYTES # BLD AUTO: 2.09 THOUSANDS/ÂΜL (ref 0.6–4.47)
LYMPHOCYTES NFR BLD AUTO: 29 % (ref 14–44)
MCH RBC QN AUTO: 24.9 PG (ref 26.8–34.3)
MCHC RBC AUTO-ENTMCNC: 30.4 G/DL (ref 31.4–37.4)
MCV RBC AUTO: 82 FL (ref 82–98)
MONOCYTES # BLD AUTO: 0.55 THOUSAND/ÂΜL (ref 0.17–1.22)
MONOCYTES NFR BLD AUTO: 8 % (ref 4–12)
NEUTROPHILS # BLD AUTO: 4.47 THOUSANDS/ÂΜL (ref 1.85–7.62)
NEUTS SEG NFR BLD AUTO: 62 % (ref 43–75)
NITRITE UR QL STRIP: NEGATIVE
NRBC BLD AUTO-RTO: 0 /100 WBCS
PH UR STRIP.AUTO: 5.5 [PH]
PLATELET # BLD AUTO: 300 THOUSANDS/UL (ref 149–390)
PMV BLD AUTO: 9.7 FL (ref 8.9–12.7)
POTASSIUM SERPL-SCNC: 3.8 MMOL/L (ref 3.5–5.3)
PROT SERPL-MCNC: 6.7 G/DL (ref 6.4–8.4)
PROT UR STRIP-MCNC: NEGATIVE MG/DL
RBC # BLD AUTO: 4.77 MILLION/UL (ref 3.81–5.12)
SODIUM SERPL-SCNC: 138 MMOL/L (ref 135–147)
SP GR UR STRIP.AUTO: 1.02 (ref 1–1.03)
UROBILINOGEN UR STRIP-ACNC: <2 MG/DL
WBC # BLD AUTO: 7.24 THOUSAND/UL (ref 4.31–10.16)

## 2024-07-25 PROCEDURE — 36415 COLL VENOUS BLD VENIPUNCTURE: CPT | Performed by: EMERGENCY MEDICINE

## 2024-07-25 PROCEDURE — 96374 THER/PROPH/DIAG INJ IV PUSH: CPT

## 2024-07-25 PROCEDURE — 80053 COMPREHEN METABOLIC PANEL: CPT | Performed by: EMERGENCY MEDICINE

## 2024-07-25 PROCEDURE — 76830 TRANSVAGINAL US NON-OB: CPT

## 2024-07-25 PROCEDURE — 85025 COMPLETE CBC W/AUTO DIFF WBC: CPT | Performed by: EMERGENCY MEDICINE

## 2024-07-25 PROCEDURE — 81003 URINALYSIS AUTO W/O SCOPE: CPT | Performed by: EMERGENCY MEDICINE

## 2024-07-25 PROCEDURE — 81025 URINE PREGNANCY TEST: CPT | Performed by: EMERGENCY MEDICINE

## 2024-07-25 PROCEDURE — 76856 US EXAM PELVIC COMPLETE: CPT

## 2024-07-25 PROCEDURE — 99284 EMERGENCY DEPT VISIT MOD MDM: CPT

## 2024-07-25 PROCEDURE — 99285 EMERGENCY DEPT VISIT HI MDM: CPT | Performed by: EMERGENCY MEDICINE

## 2024-07-25 PROCEDURE — 74177 CT ABD & PELVIS W/CONTRAST: CPT

## 2024-07-25 RX ORDER — KETOROLAC TROMETHAMINE 30 MG/ML
15 INJECTION, SOLUTION INTRAMUSCULAR; INTRAVENOUS ONCE
Status: COMPLETED | OUTPATIENT
Start: 2024-07-25 | End: 2024-07-25

## 2024-07-25 RX ADMIN — KETOROLAC TROMETHAMINE 15 MG: 30 INJECTION, SOLUTION INTRAMUSCULAR; INTRAVENOUS at 09:57

## 2024-07-25 RX ADMIN — IOHEXOL 100 ML: 350 INJECTION, SOLUTION INTRAVENOUS at 10:51

## 2024-07-25 NOTE — Clinical Note
Janene Arroyo was seen and treated in our emergency department on 7/25/2024.    No restrictions        none    Diagnosis: Ovarian cyst    Janene  may return to work on return date.    She may return on this date: 07/29/2024         If you have any questions or concerns, please don't hesitate to call.      Luiz Cedillo MD    ______________________________           _______________          _______________  Hospital Representative                              Date                                Time

## 2024-07-25 NOTE — ED PROVIDER NOTES
History  Chief Complaint   Patient presents with    Abdominal Pain     Pt reports RLQ pain since yesterday. Tenderness with palpation and some nausea.       History provided by:  Patient   used: No    Abdominal Pain  Pain location:  RLQ  Pain quality: sharp    Pain radiates to:  Does not radiate  Pain severity:  Moderate  Onset quality:  Gradual  Duration:  2 days  Timing:  Constant  Progression:  Unchanged  Chronicity:  New  Context: previous surgery    Context: not alcohol use, not awakening from sleep, not diet changes, not eating, not laxative use, not medication withdrawal, not recent illness, not recent sexual activity, not recent travel, not retching, not sick contacts, not suspicious food intake and not trauma    Context comment:  Patient with history of right ectopic pregnancy status post salpingectomy December 2024.  Relieved by:  Nothing  Worsened by:  Nothing  Ineffective treatments:  None tried  Associated symptoms: no anorexia, no belching, no chest pain, no chills, no constipation, no cough, no diarrhea, no dysuria, no fatigue, no fever, no flatus, no hematemesis, no hematochezia, no hematuria, no melena, no nausea, no shortness of breath, no sore throat, no vaginal bleeding, no vaginal discharge and no vomiting    Risk factors: no alcohol abuse, no aspirin use, not elderly, has not had multiple surgeries, no NSAID use, not obese, not pregnant and no recent hospitalization        Prior to Admission Medications   Prescriptions Last Dose Informant Patient Reported? Taking?   EPINEPHrine (EPIPEN JR) 0.15 mg/0.3 mL SOAJ   Yes No   Sig: Inject 0.15 mg into a muscle once   albuterol (2.5 mg/3 mL) 0.083 % nebulizer solution   No No   Sig: Take 1 vial (2.5 mg total) by nebulization every 6 (six) hours as needed for wheezing or shortness of breath   albuterol (ACCUNEB) 0.63 MG/3ML nebulizer solution   Yes No   Sig: Take 1 ampule by nebulization every 4 (four) hours as needed for wheezing    albuterol (PROVENTIL HFA,VENTOLIN HFA) 90 mcg/act inhaler   Yes No   Sig: Inhale 2 puffs every 4 (four) hours as needed for wheezing   albuterol (Ventolin HFA) 90 mcg/act inhaler   No No   Sig: Inhale 2 puffs every 6 (six) hours as needed for wheezing   benzonatate (TESSALON PERLES) 100 mg capsule   No No   Sig: Take 1 capsule (100 mg total) by mouth every 8 (eight) hours   budesonide-formoterol (SYMBICORT) 80-4.5 MCG/ACT inhaler   No No   Sig: Inhale 2 puffs 2 (two) times a day Rinse mouth after use.   Patient not taking: Reported on 12/19/2023   dextromethorphan-guaifenesin (MUCINEX DM)  MG per 12 hr tablet   No No   Sig: Take 1 tablet by mouth every 12 (twelve) hours   ferrous sulfate 325 (65 Fe) mg tablet   Yes No   Sig: Take 1 tablet by mouth daily   ibuprofen (MOTRIN) 600 mg tablet   No No   Sig: Take 1 tablet (600 mg total) by mouth every 6 (six) hours as needed for mild pain   levocetirizine (XYZAL) 5 MG tablet   No No   Sig: Take 1 tablet (5 mg total) by mouth every evening      Facility-Administered Medications: None       Past Medical History:   Diagnosis Date    Allergic     Allergic rhinitis     Anemia     Asthma     Eczema     Headache        Past Surgical History:   Procedure Laterality Date    [place] LAPAROSCOPY W/RMVL ADNEXAL STRUCTURES Right 12/1/2023    Procedure: SALPINGECTOMY, LAPAROSCOPIC AND REMOVAL OF PERITONEAL LESION;  Surgeon: uCca Friedman DO;  Location: [facility];  Service: Gynecology       Family History   Problem Relation Age of Onset    Sjogren's syndrome Mother     Asthma Mother     Allergies Mother     No Known Problems Father     No Known Problems Brother     No Known Problems Brother     No Known Problems Brother      I have reviewed and agree with the history as documented.    E-Cigarette/Vaping    E-Cigarette Use Never User      E-Cigarette/Vaping Substances    Nicotine No     THC No     CBD No     Flavoring No     Other No     Unknown No      Social History     Tobacco Use     Smoking status: Never    Smokeless tobacco: Never   Vaping Use    Vaping status: Never Used   Substance Use Topics    Alcohol use: No    Drug use: No       Review of Systems   Constitutional:  Negative for activity change, chills, fatigue and fever.   HENT:  Negative for sore throat, trouble swallowing and voice change.    Eyes:  Negative for pain and visual disturbance.   Respiratory:  Negative for cough, choking, shortness of breath and wheezing.    Cardiovascular:  Negative for chest pain and leg swelling.   Gastrointestinal:  Positive for abdominal pain and blood in stool. Negative for abdominal distention, anal bleeding, anorexia, constipation, diarrhea, flatus, hematemesis, hematochezia, melena, nausea and vomiting.   Endocrine: Negative for polydipsia and polyuria.   Genitourinary:  Negative for difficulty urinating, dysuria, flank pain, hematuria, vaginal bleeding and vaginal discharge.   Musculoskeletal:  Negative for neck stiffness.   Skin:  Negative for color change and rash.   Neurological:  Negative for dizziness, speech difficulty and headaches.   Hematological:  Does not bruise/bleed easily.   Psychiatric/Behavioral:  Negative for agitation, behavioral problems, hallucinations and suicidal ideas.        Physical Exam  Physical Exam  HENT:      Head: Normocephalic and atraumatic.   Eyes:      Conjunctiva/sclera: Conjunctivae normal.      Pupils: Pupils are equal, round, and reactive to light.   Cardiovascular:      Rate and Rhythm: Normal rate and regular rhythm.      Heart sounds: No murmur heard.  Pulmonary:      Effort: Pulmonary effort is normal. No respiratory distress.      Breath sounds: Normal breath sounds.   Abdominal:      General: Bowel sounds are normal. There is no distension.      Palpations: Abdomen is soft.      Tenderness: There is abdominal tenderness in the right lower quadrant.      Comments: No Signs of Peritonitis    Musculoskeletal:         General: Normal range of motion.       Cervical back: Normal range of motion and neck supple.   Skin:     General: Skin is warm and dry.      Capillary Refill: Capillary refill takes less than 2 seconds.      Coloration: Skin is not pale.      Findings: No rash.   Neurological:      Mental Status: She is alert and oriented to person, place, and time.      GCS: GCS eye subscore is 4. GCS verbal subscore is 5. GCS motor subscore is 6.      Comments: Normal speech, Normal gait, No Focal neurologic deficits    Psychiatric:         Behavior: Behavior normal.         Vital Signs  ED Triage Vitals   Temperature Pulse Respirations Blood Pressure SpO2   07/25/24 0923 07/25/24 0922 07/25/24 0922 07/25/24 0922 07/25/24 0922   98.3 °F (36.8 °C) (!) 106 18 170/90 99 %      Temp Source Heart Rate Source Patient Position - Orthostatic VS BP Location FiO2 (%)   07/25/24 0923 07/25/24 0922 07/25/24 0922 07/25/24 0922 --   Oral Monitor Lying Right arm       Pain Score       07/25/24 0922       4           Vitals:    07/25/24 0922   BP: 170/90   Pulse: (!) 106   Patient Position - Orthostatic VS: Lying         Visual Acuity      ED Medications  Medications   ketorolac (TORADOL) injection 15 mg (15 mg Intravenous Given 7/25/24 0957)   iohexol (OMNIPAQUE) 350 MG/ML injection (MULTI-DOSE) 100 mL (100 mL Intravenous Given 7/25/24 1051)       Diagnostic Studies  Results Reviewed       Procedure Component Value Units Date/Time    Comprehensive metabolic panel [[id number]] Collected: 07/25/24 0954    Lab Status: Final result Specimen: Blood from Arm, Right Updated: 07/25/24 1026     Sodium 138 mmol/L      Potassium 3.8 mmol/L      Chloride 108 mmol/L      CO2 25 mmol/L      ANION GAP 5 mmol/L      BUN 9 mg/dL      Creatinine 0.72 mg/dL      Glucose 79 mg/dL      Calcium 8.7 mg/dL      AST 13 U/L      ALT 10 U/L      Alkaline Phosphatase 39 U/L      Total Protein 6.7 g/dL      Albumin 4.0 g/dL      Total Bilirubin 0.38 mg/dL      eGFR 120 ml/min/1.73sq m     Narrative:       National Kidney Disease Foundation guidelines for Chronic Kidney Disease (CKD):     Stage 1 with normal or high GFR (GFR > 90 mL/min/1.73 square meters)    Stage 2 Mild CKD (GFR = 60-89 mL/min/1.73 square meters)    Stage 3A Moderate CKD (GFR = 45-59 mL/min/1.73 square meters)    Stage 3B Moderate CKD (GFR = 30-44 mL/min/1.73 square meters)    Stage 4 Severe CKD (GFR = 15-29 mL/min/1.73 square meters)    Stage 5 End Stage CKD (GFR <15 mL/min/1.73 square meters)  Note: GFR calculation is accurate only with a steady state creatinine    UA w Reflex to Microscopic w Reflex to Culture [[id number]] Collected: 07/25/24 0954    Lab Status: Final result Specimen: Urine, Clean Catch Updated: 07/25/24 1011     Color, UA Light Yellow     Clarity, UA Clear     Specific Gravity, UA 1.018     pH, UA 5.5     Leukocytes, UA Negative     Nitrite, UA Negative     Protein, UA Negative mg/dl      Glucose, UA Negative mg/dl      Ketones, UA Negative mg/dl      Urobilinogen, UA <2.0 mg/dl      Bilirubin, UA Negative     Occult Blood, UA Negative    CBC and differential [[id number]]  (Abnormal) Collected: 07/25/24 0954    Lab Status: Final result Specimen: Blood from Arm, Right Updated: 07/25/24 1001     WBC 7.24 Thousand/uL      RBC 4.77 Million/uL      Hemoglobin 11.9 g/dL      Hematocrit 39.2 %      MCV 82 fL      MCH 24.9 pg      MCHC 30.4 g/dL      RDW 14.1 %      MPV 9.7 fL      Platelets 300 Thousands/uL      nRBC 0 /100 WBCs      Segmented % 62 %      Immature Grans % 0 %      Lymphocytes % 29 %      Monocytes % 8 %      Eosinophils Relative 1 %      Basophils Relative 0 %      Absolute Neutrophils 4.47 Thousands/µL      Absolute Immature Grans 0.01 Thousand/uL      Absolute Lymphocytes 2.09 Thousands/µL      Absolute Monocytes 0.55 Thousand/µL      Eosinophils Absolute 0.10 Thousand/µL      Basophils Absolute 0.02 Thousands/µL     POCT pregnancy, urine [[id number]]  (Normal) Resulted: 07/25/24 0957    Lab Status: Final result  Updated: 07/25/24 0957     EXT Preg Test, Ur Negative     Control Valid                   US pelvis complete w transvaginal   Final Result by Maggi Murray MD (07/25 1341)   Bilateral hemorrhagic cysts. No evidence of ovarian torsion.                           Workstation performed: [id number]         CT abdomen pelvis with contrast   Final Result by Zain Yo MD (07/25 1146)      No acute findings in the abdomen or pelvis. Normal appendix.      Right ovarian 5.2 cm and left ovarian 4.5 cm simple appearing cysts.   According to current guidelines (J Am Ivan Radiol 2020; 17:248-254) in this premenopausal woman, this requires no follow-up.               Workstation performed: [id number]                    Procedures  Procedures         ED Course                                               Medical Decision Making  Patient presents with abdominal pain for past 48 hours.  Denies any urinary symptoms vaginal bleeding or discharge.    Differential diagnosis: Appendicitis, colitis, cholecystitis pregnancy, ectopic pregnancy, ovarian, cyst ovarian torsion    Plan check labs CTAP pregnancy test urinalysis reassess        Problems Addressed:  Bilateral ovarian cysts: acute illness or injury    Amount and/or Complexity of Data Reviewed  Labs: ordered.     Details: Labs reviewed: CBC CMP urinalysis urine pregnancy test negative.  Radiology: ordered.     Details: CT abdomen pelvis reviewed report bilateral ovarian cyst noted.  Pelvic ultrasound reviewed report bilateral ovarian cyst with normal flow bilaterally.  No evidence of torsion    Risk  Prescription drug management.                 Disposition  Final diagnoses:   Abdominal pain   Bilateral ovarian cysts     Time reflects when diagnosis was documented in both MDM as applicable and the Disposition within this note       Time User Action Codes Description Comment    7/25/2024  9:49 AM Luiz Cedillo Add [R10.9] Abdominal pain     7/25/2024  1:47 PM Luiz Cedillo  Add [N83.201,  N83.202] Bilateral ovarian cysts           ED Disposition       ED Disposition   Discharge    Condition   Stable    Date/Time   Thu Jul 25, 2024  1:47 PM    Comment   Janene Arroyo discharge to home/self care.                   Follow-up Information       Follow up With Specialties Details Why Contact Info Additional Information    [facility] [address] [place] [address] Gastroenterology Schedule an appointment as soon as possible for a visit   [address] [place] [id number]  [phone] [facility] [address] [place] [address],  [id number], [phone]    Samuel Robles MD Family Medicine  As needed [address] [place] [address]  [address]  [facility] [id number] [phone]       [facility] Emergency Medicine  If symptoms worsen [address] [facility] [phone]  [phone] [facility] Emergency Department, [address] [place], [place], [address]   [phone]            Patient's Medications   Discharge Prescriptions    No medications on file       No discharge procedures on file.    PDMP Review         Value Time User    PDMP Reviewed  Yes 4/6/2021  3:38 AM Forrest Mg MD            ED Provider  Electronically Signed by             Luiz Cedillo MD  07/25/24 6160

## 2024-07-25 NOTE — ED NOTES
Patient ambulatory to restroom at this time. Patient aware of need for urine specimen.      Autumn Morfin RN  07/25/24 0944

## 2024-07-25 NOTE — DISCHARGE INSTRUCTIONS
Please follow-up with your OB/GYN for reevaluation of your ovarian cysts.  Please return the emergency department should your abdominal pain persist or worsen, or should you  develop severe nausea , vomiting, high fevers have any other concerns.    Given your  history of rectal bleeding I have also provided you contact information for [facility] to follow-up as outpatient.

## 2024-08-19 ENCOUNTER — OFFICE VISIT (OUTPATIENT)
Dept: OBGYN CLINIC | Facility: CLINIC | Age: 24
End: 2024-08-19

## 2024-08-19 VITALS
SYSTOLIC BLOOD PRESSURE: 121 MMHG | BODY MASS INDEX: 34.96 KG/M2 | HEART RATE: 97 BPM | DIASTOLIC BLOOD PRESSURE: 56 MMHG | HEIGHT: 62 IN | WEIGHT: 190 LBS

## 2024-08-19 DIAGNOSIS — N83.209 CYST OF OVARY, UNSPECIFIED LATERALITY: Primary | ICD-10-CM

## 2024-08-19 PROCEDURE — 99213 OFFICE O/P EST LOW 20 MIN: CPT | Performed by: OBSTETRICS & GYNECOLOGY

## 2024-08-19 NOTE — ASSESSMENT & PLAN NOTE
Presented to ED with lower abdominal pain on 7/25, found to have bilateral hemorrhagic cysts without evidence of torsion  Pain resolved within ~1 week  Counseled patient about etiology of ovarian cysts and cyclic nature with an increased likelihood of this happening again in the future  Explained that she is at increased risk of torsion but that even a ruptured cyst can cause severe pain from peritoneal irritation  Offered hormonal birth control as an option to prevent ovulation, patient not interested at this time  All questions answered  Will return for annual exam with pap smear

## 2024-08-19 NOTE — PROGRESS NOTES
Problem Visit   24     SUBJECTIVE:  HPI: Janene Arroyo is a 23 y.o.  female who presents for follow up of her ED visit on . No complaints today. See below for discussion and plan.     Past Medical History:   Diagnosis Date    Allergic     Allergic rhinitis     Anemia     Asthma     Eczema     Headache        Past Surgical History:   Procedure Laterality Date    [place] LAPAROSCOPY W/RMVL ADNEXAL STRUCTURES Right 2023    Procedure: SALPINGECTOMY, LAPAROSCOPIC AND REMOVAL OF PERITONEAL LESION;  Surgeon: Cuca Friedman DO;  Location: [facility];  Service: Gynecology       Social History     Tobacco Use    Smoking status: Never    Smokeless tobacco: Never   Vaping Use    Vaping status: Never Used   Substance Use Topics    Alcohol use: No    Drug use: No         Current Outpatient Medications:     albuterol (2.5 mg/3 mL) 0.083 % nebulizer solution, Take 1 vial (2.5 mg total) by nebulization every 6 (six) hours as needed for wheezing or shortness of breath (Patient not taking: Reported on 2024), Disp: 75 mL, Rfl: 0    albuterol (ACCUNEB) 0.63 MG/3ML nebulizer solution, Take 1 ampule by nebulization every 4 (four) hours as needed for wheezing (Patient not taking: Reported on 2024), Disp: , Rfl:     albuterol (PROVENTIL HFA,VENTOLIN HFA) 90 mcg/act inhaler, Inhale 2 puffs every 4 (four) hours as needed for wheezing (Patient not taking: Reported on 2024), Disp: , Rfl:     albuterol (Ventolin HFA) 90 mcg/act inhaler, Inhale 2 puffs every 6 (six) hours as needed for wheezing (Patient not taking: Reported on 2024), Disp: 18 g, Rfl: 3    benzonatate (TESSALON PERLES) 100 mg capsule, Take 1 capsule (100 mg total) by mouth every 8 (eight) hours (Patient not taking: Reported on 2024), Disp: 21 capsule, Rfl: 0    budesonide-formoterol (SYMBICORT) 80-4.5 MCG/ACT inhaler, Inhale 2 puffs 2 (two) times a day Rinse mouth after use. (Patient not taking: Reported on 2023), Disp: 1  "Inhaler, Rfl: 12    dextromethorphan-guaifenesin (MUCINEX DM)  MG per 12 hr tablet, Take 1 tablet by mouth every 12 (twelve) hours (Patient not taking: Reported on 8/19/2024), Disp: 30 tablet, Rfl: 0    EPINEPHrine (EPIPEN JR) 0.15 mg/0.3 mL SOAJ, Inject 0.15 mg into a muscle once (Patient not taking: Reported on 8/19/2024), Disp: , Rfl:     ferrous sulfate 325 (65 Fe) mg tablet, Take 1 tablet by mouth daily (Patient not taking: Reported on 8/19/2024), Disp: , Rfl:     ibuprofen (MOTRIN) 600 mg tablet, Take 1 tablet (600 mg total) by mouth every 6 (six) hours as needed for mild pain (Patient not taking: Reported on 8/19/2024), Disp: 30 tablet, Rfl: 0    levocetirizine (XYZAL) 5 MG tablet, Take 1 tablet (5 mg total) by mouth every evening (Patient not taking: Reported on 8/19/2024), Disp: 30 tablet, Rfl: 0      OBJECTIVE:  Vitals:    08/19/24 0826   BP: 121/56   Pulse: 97   Weight: 86.2 kg (190 lb)   Height: 5' 2\" (1.575 m)       Physical Exam  GEN: The patient was alert and oriented x3, pleasant well-appearing female in no acute distress.   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    Allergic rhinitis due to house dust mite    Chronic seasonal allergic rhinitis due to pollen    Asthma    Ovarian cyst    Current Assessment & Plan     Presented to ED with lower abdominal pain on 7/25, found to have bilateral hemorrhagic cysts without evidence of torsion  Pain resolved within ~1 week  Counseled patient about etiology of ovarian cysts and cyclic nature with an increased likelihood of this happening again in the future  Explained that she is at increased risk of torsion but that even a ruptured cyst can cause severe pain from peritoneal irritation  Offered hormonal birth control as an option to " prevent ovulation, patient not interested at this time  All questions answered  Will return for annual exam with pap smear              Doris Herman MD   OBGYN PGY-4  08/19/24 8:44 AM

## 2024-11-26 ENCOUNTER — APPOINTMENT (OUTPATIENT)
Dept: LAB | Facility: CLINIC | Age: 24
End: 2024-11-26

## 2024-11-26 ENCOUNTER — APPOINTMENT (OUTPATIENT)
Dept: URGENT CARE | Facility: CLINIC | Age: 24
End: 2024-11-26

## 2024-11-26 DIAGNOSIS — Z02.1 ENCOUNTER FOR PRE-EMPLOYMENT EXAMINATION: ICD-10-CM

## 2024-11-26 DIAGNOSIS — Z02.1 ENCOUNTER FOR PRE-EMPLOYMENT EXAMINATION: Primary | ICD-10-CM

## 2024-11-26 LAB
MEV IGG SER QL IA: NORMAL
MUV IGG SER QL IA: NORMAL
RUBV IGG SERPL IA-ACNC: 57.5 IU/ML
VZV IGG SER QL IA: ABNORMAL

## 2024-11-26 PROCEDURE — 86480 TB TEST CELL IMMUN MEASURE: CPT

## 2024-11-26 PROCEDURE — 36415 COLL VENOUS BLD VENIPUNCTURE: CPT

## 2024-11-26 PROCEDURE — 86762 RUBELLA ANTIBODY: CPT

## 2024-11-26 PROCEDURE — 86787 VARICELLA-ZOSTER ANTIBODY: CPT

## 2024-11-26 PROCEDURE — 86735 MUMPS ANTIBODY: CPT

## 2024-11-26 PROCEDURE — 86765 RUBEOLA ANTIBODY: CPT

## 2024-11-27 LAB
GAMMA INTERFERON BACKGROUND BLD IA-ACNC: <0 IU/ML
M TB IFN-G BLD-IMP: NEGATIVE
M TB IFN-G CD4+ BCKGRND COR BLD-ACNC: 0 IU/ML
M TB IFN-G CD4+ BCKGRND COR BLD-ACNC: 0 IU/ML
MITOGEN IGNF BCKGRD COR BLD-ACNC: 10 IU/ML

## 2025-03-23 ENCOUNTER — APPOINTMENT (OUTPATIENT)
Dept: LAB | Facility: CLINIC | Age: 25
End: 2025-03-23
Payer: COMMERCIAL

## 2025-03-23 ENCOUNTER — TRANSCRIBE ORDERS (OUTPATIENT)
Dept: LAB | Facility: CLINIC | Age: 25
End: 2025-03-23

## 2025-03-23 DIAGNOSIS — E78.5 HYPERLIPIDEMIA, UNSPECIFIED HYPERLIPIDEMIA TYPE: ICD-10-CM

## 2025-03-23 DIAGNOSIS — Z00.00 ROUTINE GENERAL MEDICAL EXAMINATION AT A HEALTH CARE FACILITY: ICD-10-CM

## 2025-03-23 DIAGNOSIS — Z13.9 SCREENING FOR UNSPECIFIED CONDITION: ICD-10-CM

## 2025-03-23 DIAGNOSIS — R94.6 NONSPECIFIC ABNORMAL RESULTS OF THYROID FUNCTION STUDY: ICD-10-CM

## 2025-03-23 DIAGNOSIS — E55.9 AVITAMINOSIS D: ICD-10-CM

## 2025-03-23 DIAGNOSIS — R79.89 HYPOURICEMIA: ICD-10-CM

## 2025-03-23 DIAGNOSIS — R82.90 NONSPECIFIC FINDING ON EXAMINATION OF URINE: Primary | ICD-10-CM

## 2025-03-23 DIAGNOSIS — R82.90 NONSPECIFIC FINDING ON EXAMINATION OF URINE: ICD-10-CM

## 2025-03-23 LAB
25(OH)D3 SERPL-MCNC: 19.8 NG/ML (ref 30–100)
ALBUMIN SERPL BCG-MCNC: 4.2 G/DL (ref 3.5–5)
ALP SERPL-CCNC: 36 U/L (ref 34–104)
ALT SERPL W P-5'-P-CCNC: 9 U/L (ref 7–52)
ANION GAP SERPL CALCULATED.3IONS-SCNC: 8 MMOL/L (ref 4–13)
AST SERPL W P-5'-P-CCNC: 13 U/L (ref 13–39)
BACTERIA UR QL AUTO: ABNORMAL /HPF
BASOPHILS # BLD AUTO: 0.03 THOUSANDS/ÂΜL (ref 0–0.1)
BASOPHILS NFR BLD AUTO: 0 % (ref 0–1)
BILIRUB SERPL-MCNC: 0.47 MG/DL (ref 0.2–1)
BILIRUB UR QL STRIP: NEGATIVE
BUN SERPL-MCNC: 12 MG/DL (ref 5–25)
CALCIUM SERPL-MCNC: 8.9 MG/DL (ref 8.4–10.2)
CHLORIDE SERPL-SCNC: 106 MMOL/L (ref 96–108)
CHOLEST SERPL-MCNC: 105 MG/DL (ref ?–200)
CLARITY UR: CLEAR
CO2 SERPL-SCNC: 23 MMOL/L (ref 21–32)
COLOR UR: ABNORMAL
CREAT SERPL-MCNC: 0.86 MG/DL (ref 0.6–1.3)
EOSINOPHIL # BLD AUTO: 0.14 THOUSAND/ÂΜL (ref 0–0.61)
EOSINOPHIL NFR BLD AUTO: 2 % (ref 0–6)
ERYTHROCYTE [DISTWIDTH] IN BLOOD BY AUTOMATED COUNT: 16.5 % (ref 11.6–15.1)
EST. AVERAGE GLUCOSE BLD GHB EST-MCNC: 108 MG/DL
FERRITIN SERPL-MCNC: 5 NG/ML (ref 11–307)
GFR SERPL CREATININE-BSD FRML MDRD: 96 ML/MIN/1.73SQ M
GLUCOSE P FAST SERPL-MCNC: 80 MG/DL (ref 65–99)
GLUCOSE UR STRIP-MCNC: NEGATIVE MG/DL
HBA1C MFR BLD: 5.4 %
HCT VFR BLD AUTO: 37.5 % (ref 34.8–46.1)
HDLC SERPL-MCNC: 41 MG/DL
HGB BLD-MCNC: 11.4 G/DL (ref 11.5–15.4)
HGB UR QL STRIP.AUTO: NEGATIVE
IMM GRANULOCYTES # BLD AUTO: 0.02 THOUSAND/UL (ref 0–0.2)
IMM GRANULOCYTES NFR BLD AUTO: 0 % (ref 0–2)
KETONES UR STRIP-MCNC: NEGATIVE MG/DL
LDLC SERPL CALC-MCNC: 53 MG/DL (ref 0–100)
LEUKOCYTE ESTERASE UR QL STRIP: NEGATIVE
LYMPHOCYTES # BLD AUTO: 1.79 THOUSANDS/ÂΜL (ref 0.6–4.47)
LYMPHOCYTES NFR BLD AUTO: 26 % (ref 14–44)
MCH RBC QN AUTO: 24.5 PG (ref 26.8–34.3)
MCHC RBC AUTO-ENTMCNC: 30.4 G/DL (ref 31.4–37.4)
MCV RBC AUTO: 81 FL (ref 82–98)
MONOCYTES # BLD AUTO: 0.49 THOUSAND/ÂΜL (ref 0.17–1.22)
MONOCYTES NFR BLD AUTO: 7 % (ref 4–12)
MUCOUS THREADS UR QL AUTO: ABNORMAL
NEUTROPHILS # BLD AUTO: 4.33 THOUSANDS/ÂΜL (ref 1.85–7.62)
NEUTS SEG NFR BLD AUTO: 65 % (ref 43–75)
NITRITE UR QL STRIP: NEGATIVE
NON-SQ EPI CELLS URNS QL MICRO: ABNORMAL /HPF
NONHDLC SERPL-MCNC: 64 MG/DL
NRBC BLD AUTO-RTO: 0 /100 WBCS
PH UR STRIP.AUTO: 7.5 [PH]
PLATELET # BLD AUTO: 323 THOUSANDS/UL (ref 149–390)
PMV BLD AUTO: 10.6 FL (ref 8.9–12.7)
POTASSIUM SERPL-SCNC: 4 MMOL/L (ref 3.5–5.3)
PROT SERPL-MCNC: 6.4 G/DL (ref 6.4–8.4)
PROT UR STRIP-MCNC: ABNORMAL MG/DL
RBC # BLD AUTO: 4.66 MILLION/UL (ref 3.81–5.12)
RBC #/AREA URNS AUTO: ABNORMAL /HPF
SODIUM SERPL-SCNC: 137 MMOL/L (ref 135–147)
SP GR UR STRIP.AUTO: 1.02 (ref 1–1.03)
TRIGL SERPL-MCNC: 57 MG/DL (ref ?–150)
TSH SERPL DL<=0.05 MIU/L-ACNC: 0.67 UIU/ML (ref 0.45–4.5)
UROBILINOGEN UR STRIP-ACNC: <2 MG/DL
WBC # BLD AUTO: 6.8 THOUSAND/UL (ref 4.31–10.16)
WBC #/AREA URNS AUTO: ABNORMAL /HPF

## 2025-03-23 PROCEDURE — 80053 COMPREHEN METABOLIC PANEL: CPT

## 2025-03-23 PROCEDURE — 81001 URINALYSIS AUTO W/SCOPE: CPT

## 2025-03-23 PROCEDURE — 80061 LIPID PANEL: CPT

## 2025-03-23 PROCEDURE — 36415 COLL VENOUS BLD VENIPUNCTURE: CPT

## 2025-03-23 PROCEDURE — 85025 COMPLETE CBC W/AUTO DIFF WBC: CPT

## 2025-03-23 PROCEDURE — 82306 VITAMIN D 25 HYDROXY: CPT

## 2025-03-23 PROCEDURE — 82728 ASSAY OF FERRITIN: CPT

## 2025-03-23 PROCEDURE — 83036 HEMOGLOBIN GLYCOSYLATED A1C: CPT

## 2025-03-23 PROCEDURE — 84443 ASSAY THYROID STIM HORMONE: CPT

## 2025-05-18 ENCOUNTER — APPOINTMENT (OUTPATIENT)
Dept: LAB | Facility: CLINIC | Age: 25
End: 2025-05-18
Attending: FAMILY MEDICINE
Payer: COMMERCIAL

## 2025-05-18 DIAGNOSIS — R80.0 ISOLATED NON-NEPHROTIC PROTEINURIA: ICD-10-CM

## 2025-05-18 DIAGNOSIS — R82.90 NONSPECIFIC FINDING ON EXAMINATION OF URINE: ICD-10-CM

## 2025-05-18 LAB
BACTERIA UR QL AUTO: ABNORMAL /HPF
BILIRUB UR QL STRIP: NEGATIVE
CLARITY UR: ABNORMAL
COLOR UR: ABNORMAL
GLUCOSE UR STRIP-MCNC: NEGATIVE MG/DL
HGB UR QL STRIP.AUTO: NEGATIVE
KETONES UR STRIP-MCNC: NEGATIVE MG/DL
LEUKOCYTE ESTERASE UR QL STRIP: NEGATIVE
MUCOUS THREADS UR QL AUTO: ABNORMAL
NITRITE UR QL STRIP: NEGATIVE
NON-SQ EPI CELLS URNS QL MICRO: ABNORMAL /HPF
PH UR STRIP.AUTO: 6.5 [PH]
PROT UR STRIP-MCNC: ABNORMAL MG/DL
RBC #/AREA URNS AUTO: ABNORMAL /HPF
SP GR UR STRIP.AUTO: 1.02 (ref 1–1.03)
UROBILINOGEN UR STRIP-ACNC: <2 MG/DL
WBC #/AREA URNS AUTO: ABNORMAL /HPF

## 2025-05-18 PROCEDURE — 81001 URINALYSIS AUTO W/SCOPE: CPT

## (undated) DEVICE — 3M™ TEGADERM™ TRANSPARENT FILM DRESSING FRAME STYLE, 1624W, 2-3/8 IN X 2-3/4 IN (6 CM X 7 CM), 100/CT 4CT/CASE: Brand: 3M™ TEGADERM™

## (undated) DEVICE — INTENDED FOR TISSUE SEPARATION, AND OTHER PROCEDURES THAT REQUIRE A SHARP SURGICAL BLADE TO PUNCTURE OR CUT.: Brand: BARD-PARKER SAFETY BLADES SIZE 11, STERILE

## (undated) DEVICE — TROCAR: Brand: KII SLEEVE

## (undated) DEVICE — ADHESIVE SKIN HIGH VISCOSITY EXOFIN 1ML

## (undated) DEVICE — BETHLEHEM UNIVERSAL GYN LAP PK: Brand: CARDINAL HEALTH

## (undated) DEVICE — CHLORAPREP HI-LITE 26ML ORANGE

## (undated) DEVICE — SUT MONOCRYL 4-0 PS-2 18 IN Y496G

## (undated) DEVICE — ENSEAL X1 TISSUE SEALER, CURVED JAW, 37 CM SHAFT LENGTH: Brand: ENSEAL

## (undated) DEVICE — FLEXIBLE ADHESIVE BANDAGE,X-LARGE: Brand: CURITY

## (undated) DEVICE — MAYO STAND COVER: Brand: CONVERTORS

## (undated) DEVICE — HYDROPHILIC WOUND DRESSING WITH ZINC PLUS VITAMINS A AND B6.: Brand: DERMAGRAN®-B

## (undated) DEVICE — GLOVE INDICATOR PI UNDERGLOVE SZ 6.5 BLUE

## (undated) DEVICE — PREMIUM DRY TRAY LF: Brand: MEDLINE INDUSTRIES, INC.

## (undated) DEVICE — 2, DISPOSABLE SUCTION/IRRIGATOR WITHOUT DISPOSABLE TIP: Brand: STRYKEFLOW

## (undated) DEVICE — INSUFFLATION NEEDLE TO ESTABLISH PNEUMOPERITONEUM.: Brand: INSUFFLATION NEEDLE

## (undated) DEVICE — TRAY FOLEY 16FR URIMETER SILICONE SURESTEP

## (undated) DEVICE — GLOVE SRG LF STRL BGL SKNSNS 6 PF

## (undated) DEVICE — 3M™ STERI-STRIP™ REINFORCED ADHESIVE SKIN CLOSURES, R1547, 1/2 IN X 4 IN (12 MM X 100 MM), 6 STRIPS/ENVELOPE: Brand: 3M™ STERI-STRIP™